# Patient Record
Sex: FEMALE | Race: WHITE | NOT HISPANIC OR LATINO
[De-identification: names, ages, dates, MRNs, and addresses within clinical notes are randomized per-mention and may not be internally consistent; named-entity substitution may affect disease eponyms.]

---

## 2018-03-09 PROBLEM — C54.1 ENDOMETRIAL CARCINOMA: Status: RESOLVED | Noted: 2018-03-09 | Resolved: 2018-03-09

## 2018-03-12 ENCOUNTER — APPOINTMENT (OUTPATIENT)
Dept: HEMATOLOGY ONCOLOGY | Facility: CLINIC | Age: 58
End: 2018-03-12
Payer: COMMERCIAL

## 2018-03-12 VITALS
BODY MASS INDEX: 28.53 KG/M2 | DIASTOLIC BLOOD PRESSURE: 76 MMHG | HEIGHT: 62.6 IN | WEIGHT: 159 LBS | OXYGEN SATURATION: 96 % | RESPIRATION RATE: 16 BRPM | SYSTOLIC BLOOD PRESSURE: 121 MMHG | HEART RATE: 83 BPM | TEMPERATURE: 98.7 F

## 2018-03-12 DIAGNOSIS — Z80.0 FAMILY HISTORY OF MALIGNANT NEOPLASM OF DIGESTIVE ORGANS: ICD-10-CM

## 2018-03-12 DIAGNOSIS — Z78.9 OTHER SPECIFIED HEALTH STATUS: ICD-10-CM

## 2018-03-12 DIAGNOSIS — G47.09 OTHER INSOMNIA: ICD-10-CM

## 2018-03-12 DIAGNOSIS — Z80.41 FAMILY HISTORY OF MALIGNANT NEOPLASM OF OVARY: ICD-10-CM

## 2018-03-12 DIAGNOSIS — Z86.39 PERSONAL HISTORY OF OTHER ENDOCRINE, NUTRITIONAL AND METABOLIC DISEASE: ICD-10-CM

## 2018-03-12 DIAGNOSIS — C54.1 MALIGNANT NEOPLASM OF ENDOMETRIUM: ICD-10-CM

## 2018-03-12 DIAGNOSIS — Z87.891 PERSONAL HISTORY OF NICOTINE DEPENDENCE: ICD-10-CM

## 2018-03-12 DIAGNOSIS — Z80.51 FAMILY HISTORY OF MALIGNANT NEOPLASM OF KIDNEY: ICD-10-CM

## 2018-03-12 PROCEDURE — 99245 OFF/OP CONSLTJ NEW/EST HI 55: CPT

## 2018-03-12 RX ORDER — ATORVASTATIN CALCIUM 10 MG/1
10 TABLET, FILM COATED ORAL
Refills: 0 | Status: ACTIVE | COMMUNITY

## 2018-03-12 RX ORDER — LEVOTHYROXINE SODIUM 0.09 MG/1
88 TABLET ORAL
Refills: 0 | Status: ACTIVE | COMMUNITY

## 2018-04-09 ENCOUNTER — APPOINTMENT (OUTPATIENT)
Dept: HEMATOLOGY ONCOLOGY | Facility: CLINIC | Age: 58
End: 2018-04-09
Payer: COMMERCIAL

## 2018-04-09 VITALS
RESPIRATION RATE: 20 BRPM | TEMPERATURE: 98.4 F | SYSTOLIC BLOOD PRESSURE: 124 MMHG | OXYGEN SATURATION: 98 % | DIASTOLIC BLOOD PRESSURE: 80 MMHG | BODY MASS INDEX: 28.89 KG/M2 | WEIGHT: 161 LBS | HEART RATE: 75 BPM | HEIGHT: 62.6 IN

## 2018-04-09 DIAGNOSIS — Z00.00 ENCOUNTER FOR GENERAL ADULT MEDICAL EXAMINATION W/OUT ABNORMAL FINDINGS: ICD-10-CM

## 2018-04-09 PROCEDURE — 99215 OFFICE O/P EST HI 40 MIN: CPT

## 2018-04-16 ENCOUNTER — OTHER (OUTPATIENT)
Age: 58
End: 2018-04-16

## 2018-04-16 DIAGNOSIS — Z91.041 RADIOGRAPHIC DYE ALLERGY STATUS: ICD-10-CM

## 2018-04-24 ENCOUNTER — OTHER (OUTPATIENT)
Age: 58
End: 2018-04-24

## 2018-04-25 ENCOUNTER — OTHER (OUTPATIENT)
Age: 58
End: 2018-04-25

## 2018-05-30 ENCOUNTER — RX RENEWAL (OUTPATIENT)
Age: 58
End: 2018-05-30

## 2018-07-09 ENCOUNTER — APPOINTMENT (OUTPATIENT)
Dept: HEMATOLOGY ONCOLOGY | Facility: CLINIC | Age: 58
End: 2018-07-09
Payer: COMMERCIAL

## 2018-07-23 PROBLEM — Z78.9 ALCOHOL USE: Status: ACTIVE | Noted: 2018-03-12

## 2018-08-06 ENCOUNTER — APPOINTMENT (OUTPATIENT)
Dept: HEMATOLOGY ONCOLOGY | Facility: CLINIC | Age: 58
End: 2018-08-06
Payer: COMMERCIAL

## 2018-08-06 VITALS
WEIGHT: 162 LBS | RESPIRATION RATE: 20 BRPM | OXYGEN SATURATION: 98 % | SYSTOLIC BLOOD PRESSURE: 112 MMHG | DIASTOLIC BLOOD PRESSURE: 76 MMHG | TEMPERATURE: 98 F | HEIGHT: 62.6 IN | HEART RATE: 86 BPM | BODY MASS INDEX: 29.07 KG/M2

## 2018-08-06 PROCEDURE — 99214 OFFICE O/P EST MOD 30 MIN: CPT

## 2018-08-06 RX ORDER — METHYLPREDNISOLONE 8 MG/1
8 TABLET ORAL
Qty: 8 | Refills: 1 | Status: DISCONTINUED | COMMUNITY
Start: 2018-04-16 | End: 2018-08-06

## 2018-11-05 ENCOUNTER — APPOINTMENT (OUTPATIENT)
Dept: HEMATOLOGY ONCOLOGY | Facility: CLINIC | Age: 58
End: 2018-11-05
Payer: COMMERCIAL

## 2018-11-08 ENCOUNTER — RECORD ABSTRACTING (OUTPATIENT)
Age: 58
End: 2018-11-08

## 2018-11-08 DIAGNOSIS — E03.8 OTHER SPECIFIED HYPOTHYROIDISM: ICD-10-CM

## 2018-11-08 DIAGNOSIS — Z80.3 FAMILY HISTORY OF MALIGNANT NEOPLASM OF BREAST: ICD-10-CM

## 2018-11-08 DIAGNOSIS — Z83.3 FAMILY HISTORY OF DIABETES MELLITUS: ICD-10-CM

## 2018-11-08 DIAGNOSIS — Z80.8 FAMILY HISTORY OF MALIGNANT NEOPLASM OF OTHER ORGANS OR SYSTEMS: ICD-10-CM

## 2018-11-08 DIAGNOSIS — Z85.3 PERSONAL HISTORY OF MALIGNANT NEOPLASM OF BREAST: ICD-10-CM

## 2018-11-09 ENCOUNTER — RECORD ABSTRACTING (OUTPATIENT)
Age: 58
End: 2018-11-09

## 2018-11-16 ENCOUNTER — OTHER (OUTPATIENT)
Age: 58
End: 2018-11-16

## 2018-11-26 ENCOUNTER — RESULT REVIEW (OUTPATIENT)
Age: 58
End: 2018-11-26

## 2018-11-26 ENCOUNTER — APPOINTMENT (OUTPATIENT)
Dept: HEMATOLOGY ONCOLOGY | Facility: CLINIC | Age: 58
End: 2018-11-26
Payer: COMMERCIAL

## 2018-11-26 VITALS
DIASTOLIC BLOOD PRESSURE: 88 MMHG | RESPIRATION RATE: 18 BRPM | WEIGHT: 165 LBS | HEART RATE: 99 BPM | TEMPERATURE: 98 F | OXYGEN SATURATION: 96 % | BODY MASS INDEX: 29.6 KG/M2 | SYSTOLIC BLOOD PRESSURE: 142 MMHG

## 2018-11-26 PROCEDURE — 99214 OFFICE O/P EST MOD 30 MIN: CPT

## 2018-11-26 RX ORDER — IBANDRONATE SODIUM 150 MG/1
150 TABLET, FILM COATED ORAL
Refills: 0 | Status: DISCONTINUED | COMMUNITY
End: 2018-11-26

## 2018-11-26 NOTE — ASSESSMENT
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 \par - continue close surveillance\par - CBC,Chem Profile, CEA,,CA27-29,\par \par \par \par \par Breast cancer\par Stage Ia ( T1a) 5mm, ER/ [place] positive, HER 2 kassy not amplified. \par Anastrozole  1mg PO daily x 4 years - continue \par Patient had genetic testing done only for BRCA1 and 2- extended genetic testing done today with [facility] in view of extensive h/o malignancy in the family ( renal, colon cancer etc).\par \par \par Mammogram/ Sonogram due in August 2018.\par \par Discussed weight control and healthy eating habits.  Encourage to participate in moderate exercise.\par \par Uterine cancer 2013 - stage I, FIGO grade 1, endometrioid, s/p TAHBSO\par \par \par \par

## 2018-11-26 NOTE — HISTORY OF PRESENT ILLNESS
[Disease: _____________________] : Disease: [unfilled] [de-identified] : Mrs Puga is a 57 year old postmenopausal female who presents for transfer of care for history of Stage IA (U6e-7dr N0M0) well differentiated intraductal ductal carcinoma of the right breast, ER/[place] positive, Her 2 negative s/p right lumpectomy sentinel node biopsy (Dr. Barker) , radiation therapy ( Dr. Izquierdo) and receiving Arimidex since 4/8/2015, followed by Dr.Julie Cifuentes at [facility].   She also has a history of Stage I (S8aOyH2) Grade 1 endometrial cancer and is status post PAULA/BSO with Dr. Rogel. She has a family history  notable for a mother with  ovarian cancer, metastatic renal cell cancer to the liver, a maternal grandmother with breast cancer in her 40s, and a half aunt with breast cancer.  Genetic testing for BRCA was negative in 2014. She was started on Boniva in 2014 when bone density at the time showed osteopenia (-1.7 L spine) and continues on it now with most recent bone density done on 08/17/2017 showing osteopenia but with T score -1.4 in the L spine and hip. She also had mammography in Aug of 2017 which she reports as normal.  She denies current complaint and tolerates Arimidex well.   [de-identified] : Patient presents today for follow up of BCA and endometrial cancer, she continues on Anastrazole with no issues.  She currently has a URI with congestion, arthralgias, cough, and pharyngitis. She denies fever.

## 2018-11-26 NOTE — RESULTS/DATA
[FreeTextEntry1] : 8/6/18\par CBC WBC 6.4 hemoglobin 13.0 hematocrit 30.9 platelets 230,000\par CEA 1.2\par CA 19-9 <1.0\par CA 27-29 11.6\par  10

## 2018-11-26 NOTE — REVIEW OF SYSTEMS
[Negative] : Allergic/Immunologic [Fatigue] : fatigue [Cough] : cough [Fever] : no fever [Recent Change In Weight] : ~T no recent weight change [Eye Pain] : no eye pain [Vision Problems] : no vision problems [Dysphagia] : no dysphagia [Hoarseness] : no hoarseness [Mucosal Pain] : no mucosal pain [Chest Pain] : no chest pain [Lower Ext Edema] : no lower extremity edema [Shortness Of Breath] : no shortness of breath [Vomiting] : no vomiting [Constipation] : no constipation [Diarrhea] : no diarrhea [Dysuria] : no dysuria [Joint Pain] : no joint pain [Muscle Pain] : no muscle pain [Skin Rash] : no skin rash [Dizziness] : no dizziness [Insomnia] : no insomnia [Anxiety] : no anxiety [Depression] : no depression [Muscle Weakness] : no muscle weakness [Easy Bleeding] : no tendency for easy bleeding [Easy Bruising] : no tendency for easy bruising [FreeTextEntry6] : Upper resp sx's

## 2018-11-26 NOTE — CONSULT LETTER
[Dear  ___] : Dear  [unfilled], [Consult Letter:] : I had the pleasure of evaluating your patient, [unfilled]. [Please see my note below.] : Please see my note below. [Consult Closing:] : Thank you very much for allowing me to participate in the care of this patient.  If you have any questions, please do not hesitate to contact me. [Sincerely,] : Sincerely, [DrStephy  ___] : Dr. GOSS [FreeTextEntry3] : Licha Yuan MD\par [facility] [place] at [facility]\par

## 2018-11-26 NOTE — REASON FOR VISIT
[Follow-Up Visit] : a follow-up [Spouse] : spouse [FreeTextEntry2] : breast cancer, endometrial cancer

## 2018-11-26 NOTE — PHYSICAL EXAM
[Fully active, able to carry on all pre-disease performance without restriction] : Status 0 - Fully active, able to carry on all pre-disease performance without restriction [Normal] : affect appropriate [de-identified] : left breast - lumpectomy scar

## 2018-12-20 ENCOUNTER — RESULT REVIEW (OUTPATIENT)
Age: 58
End: 2018-12-20

## 2019-02-25 ENCOUNTER — APPOINTMENT (OUTPATIENT)
Dept: HEMATOLOGY ONCOLOGY | Facility: CLINIC | Age: 59
End: 2019-02-25
Payer: COMMERCIAL

## 2019-02-25 PROCEDURE — 99499A: CUSTOM | Mod: NC

## 2019-02-26 ENCOUNTER — OTHER (OUTPATIENT)
Age: 59
End: 2019-02-26

## 2019-06-07 ENCOUNTER — RX RENEWAL (OUTPATIENT)
Age: 59
End: 2019-06-07

## 2019-10-02 ENCOUNTER — RESULT REVIEW (OUTPATIENT)
Age: 59
End: 2019-10-02

## 2019-10-03 ENCOUNTER — RESULT REVIEW (OUTPATIENT)
Age: 59
End: 2019-10-03

## 2019-10-03 ENCOUNTER — APPOINTMENT (OUTPATIENT)
Dept: HEMATOLOGY ONCOLOGY | Facility: CLINIC | Age: 59
End: 2019-10-03
Payer: COMMERCIAL

## 2019-10-03 VITALS
DIASTOLIC BLOOD PRESSURE: 82 MMHG | WEIGHT: 161.7 LBS | OXYGEN SATURATION: 96 % | BODY MASS INDEX: 29.01 KG/M2 | HEIGHT: 62.6 IN | RESPIRATION RATE: 16 BRPM | HEART RATE: 96 BPM | SYSTOLIC BLOOD PRESSURE: 127 MMHG

## 2019-10-03 PROCEDURE — 99214 OFFICE O/P EST MOD 30 MIN: CPT

## 2019-11-25 ENCOUNTER — RX RENEWAL (OUTPATIENT)
Age: 59
End: 2019-11-25

## 2019-11-29 NOTE — CONSULT LETTER
[Consult Letter:] : I had the pleasure of evaluating your patient, [unfilled]. [Dear  ___] : Dear  [unfilled], [Please see my note below.] : Please see my note below. [FreeTextEntry3] : Licha Yuan MD\par [facility] [place] at [facility]\par  [Consult Closing:] : Thank you very much for allowing me to participate in the care of this patient.  If you have any questions, please do not hesitate to contact me. [Sincerely,] : Sincerely, [DrStephy  ___] : Dr. GOSS

## 2019-11-29 NOTE — ASSESSMENT
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 \par Anastrozole  1mg PO daily x 4 years - continue \par \par last bone density 8/17\par T-score -2.4\par Risk factors\par Recommended:\par 1. Vitamin D\par 2. Calcium supplement 500mg\par 3. Weight bearing exercises\par \par \par \par \par Mammogram/ Sonogram due 12/19\par \par Discussed weight control and healthy eating habits.  Encourage to participate in moderate exercise.\par \par Uterine cancer 2013 - stage I, FIGO grade 1, endometrioid, s/p TAHBSO\par \par \par \par

## 2019-11-29 NOTE — HISTORY OF PRESENT ILLNESS
[de-identified] : Mrs Puag is a 57 year old postmenopausal female who presents for transfer of care for history of Stage IA (L5x-1pg N0M0) well differentiated intraductal ductal carcinoma of the right breast, ER/[place] positive, Her 2 negative s/p right lumpectomy sentinel node biopsy (Dr. Barker) , radiation therapy ( Dr. Izquierdo) and receiving Arimidex since 4/8/2015, followed by Dr.Julie Cifuentes at [facility].   She also has a history of Stage I (Y6yAeD5) Grade 1 endometrial cancer and is status post PAULA/BSO with Dr. Rogel. She has a family history  notable for a mother with  ovarian cancer, metastatic renal cell cancer to the liver, a maternal grandmother with breast cancer in her 40s, and a half aunt with breast cancer.  Genetic testing for BRCA was negative in 2014. She was started on Boniva in 2014 when bone density at the time showed osteopenia (-1.7 L spine) and continues on it now with most recent bone density done on 08/17/2017 showing osteopenia but with T score -1.4 in the L spine and hip. She also had mammography in Aug of 2017 which she reports as normal.  She denies current complaint and tolerates Arimidex well.   [Disease: _____________________] : Disease: [unfilled] [de-identified] : Patient presents today for follow up of BCA and endometrial cancer, she continues on Anastrazole with no issues.  She currently has a URI with congestion, arthralgias, cough, and pharyngitis. She denies fever.

## 2019-11-29 NOTE — CONSULT LETTER
[Consult Letter:] : I had the pleasure of evaluating your patient, [unfilled]. [Dear  ___] : Dear  [unfilled], [Please see my note below.] : Please see my note below. [FreeTextEntry3] : Licha Yuan MD\par [facility] [place] at [facility]\par  [Consult Closing:] : Thank you very much for allowing me to participate in the care of this patient.  If you have any questions, please do not hesitate to contact me. [Sincerely,] : Sincerely, [DrStephy  ___] : Dr. GOSS

## 2019-11-29 NOTE — PHYSICAL EXAM
[Fully active, able to carry on all pre-disease performance without restriction] : Status 0 - Fully active, able to carry on all pre-disease performance without restriction [Normal] : affect appropriate [de-identified] : left breast - lumpectomy scar

## 2019-11-29 NOTE — REVIEW OF SYSTEMS
[Fatigue] : fatigue [Fever] : no fever [Recent Change In Weight] : ~T no recent weight change [Eye Pain] : no eye pain [Vision Problems] : no vision problems [Dysphagia] : no dysphagia [Hoarseness] : no hoarseness [Chest Pain] : no chest pain [Mucosal Pain] : no mucosal pain [Vomiting] : no vomiting [Cough] : cough [Shortness Of Breath] : no shortness of breath [Lower Ext Edema] : no lower extremity edema [Constipation] : no constipation [Dysuria] : no dysuria [Diarrhea] : no diarrhea [Skin Rash] : no skin rash [Joint Pain] : no joint pain [Muscle Pain] : no muscle pain [Insomnia] : no insomnia [Dizziness] : no dizziness [Muscle Weakness] : no muscle weakness [Easy Bleeding] : no tendency for easy bleeding [Anxiety] : no anxiety [Depression] : no depression [Easy Bruising] : no tendency for easy bruising [Negative] : Endocrine [FreeTextEntry6] : Upper resp sx's

## 2019-11-29 NOTE — REVIEW OF SYSTEMS
[Fatigue] : fatigue [Recent Change In Weight] : ~T no recent weight change [Fever] : no fever [Eye Pain] : no eye pain [Vision Problems] : no vision problems [Dysphagia] : no dysphagia [Hoarseness] : no hoarseness [Chest Pain] : no chest pain [Mucosal Pain] : no mucosal pain [Shortness Of Breath] : no shortness of breath [Cough] : cough [Vomiting] : no vomiting [Lower Ext Edema] : no lower extremity edema [Dysuria] : no dysuria [Constipation] : no constipation [Diarrhea] : no diarrhea [Skin Rash] : no skin rash [Joint Pain] : no joint pain [Muscle Pain] : no muscle pain [Insomnia] : no insomnia [Dizziness] : no dizziness [Anxiety] : no anxiety [Muscle Weakness] : no muscle weakness [Easy Bleeding] : no tendency for easy bleeding [Depression] : no depression [Easy Bruising] : no tendency for easy bruising [Negative] : Allergic/Immunologic [FreeTextEntry6] : Upper resp sx's

## 2019-11-29 NOTE — HISTORY OF PRESENT ILLNESS
[Disease: _____________________] : Disease: [unfilled] [de-identified] : Mrs Puga is a 57 year old postmenopausal female who presents for transfer of care for history of Stage IA (R2e-5tb N0M0) well differentiated intraductal ductal carcinoma of the right breast, ER/[place] positive, Her 2 negative s/p right lumpectomy sentinel node biopsy (Dr. Barker) , radiation therapy ( Dr. Izquierdo) and receiving Arimidex since 4/8/2015, followed by Dr.Julie Cifuentes at [facility].   She also has a history of Stage I (U8oPgF4) Grade 1 endometrial cancer and is status post PAULA/BSO with Dr. Rogel. She has a family history  notable for a mother with  ovarian cancer, metastatic renal cell cancer to the liver, a maternal grandmother with breast cancer in her 40s, and a half aunt with breast cancer.  Genetic testing for BRCA was negative in 2014. She was started on Boniva in 2014 when bone density at the time showed osteopenia (-1.7 L spine) and continues on it now with most recent bone density done on 08/17/2017 showing osteopenia but with T score -1.4 in the L spine and hip. She also had mammography in Aug of 2017 which she reports as normal.  She denies current complaint and tolerates Arimidex well.   [de-identified] : Patient presents today for follow up of BCA and endometrial cancer, she continues on Anastrazole with no issues.  She currently has a URI with congestion, arthralgias, cough, and pharyngitis. She denies fever.

## 2019-11-29 NOTE — PHYSICAL EXAM
[Fully active, able to carry on all pre-disease performance without restriction] : Status 0 - Fully active, able to carry on all pre-disease performance without restriction [de-identified] : left breast - lumpectomy scar [Normal] : grossly intact

## 2019-11-29 NOTE — ASSESSMENT
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 \par Anastrozole  1mg PO daily x 4 years - continue \par \par last bone density 8/17\par T-score -2.4\par Risk factors\par Recommended:\par 1. Vitamin D\par 2. Calcium supplement 500mg\par 3. Weight bearing exercises\par \par \par \par \par Mammogram/ Sonogram due 12/19\par \par Discussed weight control and healthy eating habits.  Encourage to participate in moderate exercise.\par \par Uterine cancer 2013 - stage I, FIGO grade 1, endometrioid, s/p TAHBSO\par \par \par \par

## 2020-01-09 ENCOUNTER — RESULT REVIEW (OUTPATIENT)
Age: 60
End: 2020-01-09

## 2020-01-09 ENCOUNTER — APPOINTMENT (OUTPATIENT)
Dept: HEMATOLOGY ONCOLOGY | Facility: CLINIC | Age: 60
End: 2020-01-09
Payer: COMMERCIAL

## 2020-01-14 ENCOUNTER — RESULT REVIEW (OUTPATIENT)
Age: 60
End: 2020-01-14

## 2020-01-30 ENCOUNTER — RESULT REVIEW (OUTPATIENT)
Age: 60
End: 2020-01-30

## 2020-01-30 ENCOUNTER — APPOINTMENT (OUTPATIENT)
Dept: HEMATOLOGY ONCOLOGY | Facility: CLINIC | Age: 60
End: 2020-01-30
Payer: COMMERCIAL

## 2020-01-30 VITALS
HEIGHT: 62.6 IN | TEMPERATURE: 98 F | SYSTOLIC BLOOD PRESSURE: 133 MMHG | WEIGHT: 160 LBS | DIASTOLIC BLOOD PRESSURE: 83 MMHG | RESPIRATION RATE: 16 BRPM | BODY MASS INDEX: 28.71 KG/M2 | HEART RATE: 78 BPM | OXYGEN SATURATION: 97 %

## 2020-01-30 PROCEDURE — 99214 OFFICE O/P EST MOD 30 MIN: CPT

## 2020-01-30 RX ORDER — NYSTATIN 100000 1/G
100000 POWDER TOPICAL TWICE DAILY
Qty: 1 | Refills: 4 | Status: COMPLETED | COMMUNITY
Start: 2019-10-03 | End: 2020-01-30

## 2020-01-30 RX ORDER — CICLOPIROX OLAMINE 7.7 MG/G
0.77 CREAM TOPICAL TWICE DAILY
Qty: 1 | Refills: 1 | Status: DISCONTINUED | COMMUNITY
Start: 2019-10-03 | End: 2020-01-30

## 2020-01-30 NOTE — ASSESSMENT
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 , then every 2-5 years\par - UA to screen for hematuria for urothelial cancer- yearly - done today \par - s/p TAHBSO 2013 for endometrial cancer- monitor Ca-125\par - Breast cancer 2015- lumpectomy, on Anastrozole, continue \par - indicated for patient family testing \par - continue close surveillance\par - CBC,Chem Profile, CEA,,CA27-29,\par \par Breast cancer 2015\par Stage Ia ( T1a) 5mm, ER/ [place] positive, HER 2 kassy not amplified. \par Anastrozole  1mg PO daily x 5 years - continue \par Mammogram Jan 2020\par last bone density J an 2020\par T-score -2.3- no change \par Risk factors: postmenopausal, AI\par Recommended:\par 1. Vitamin D\par 2. Calcium supplement 500mg\par 3. Weight bearing exercises\par Offered Reclast or Prolia.\par Needs dental clearance\par \par \par \par Mammogram/ Sonogram due 12/19\par \par Discussed weight control and healthy eating habits.  Encourage to participate in moderate exercise.\par \par Uterine cancer 2013 - stage I, FIGO grade 1, endometrioid, s/p TAHBSO\par \par \par \par

## 2020-01-30 NOTE — REVIEW OF SYSTEMS
[Fever] : no fever [Recent Change In Weight] : ~T no recent weight change [Eye Pain] : no eye pain [Vision Problems] : no vision problems [Dysphagia] : no dysphagia [Hoarseness] : no hoarseness [Mucosal Pain] : no mucosal pain [Chest Pain] : no chest pain [Lower Ext Edema] : no lower extremity edema [Shortness Of Breath] : no shortness of breath [Cough] : no cough [Vomiting] : no vomiting [Constipation] : no constipation [Diarrhea] : no diarrhea [Dysuria] : no dysuria [Joint Pain] : no joint pain [Skin Rash] : no skin rash [Muscle Pain] : no muscle pain [Insomnia] : no insomnia [Dizziness] : no dizziness [Depression] : no depression [Muscle Weakness] : no muscle weakness [Anxiety] : no anxiety [Easy Bleeding] : no tendency for easy bleeding [Easy Bruising] : no tendency for easy bruising [FreeTextEntry4] : rhinitis

## 2020-01-30 NOTE — CONSULT LETTER
[FreeTextEntry3] : Licha Yuan MD\par [facility] [place] at [facility]\par

## 2020-01-30 NOTE — HISTORY OF PRESENT ILLNESS
[de-identified] : Mrs Puga is a 57 year old postmenopausal female who presents for transfer of care for history of Stage IA (H2e-9zi N0M0) well differentiated intraductal ductal carcinoma of the right breast, ER/[place] positive, Her 2 negative s/p right lumpectomy sentinel node biopsy (Dr. Barker) , radiation therapy ( Dr. Izquierdo) and receiving Arimidex since 4/8/2015, followed by Dr.Julie Cifuentes at [facility].   She also has a history of Stage I (I9bQrG0) Grade 1 endometrial cancer and is status post PAULA/BSO with Dr. Rogel. She has a family history  notable for a mother with  ovarian cancer, metastatic renal cell cancer to the liver, a maternal grandmother with breast cancer in her 40s, and a half aunt with breast cancer.  Genetic testing for BRCA was negative in 2014. She was started on Boniva in 2014 when bone density at the time showed osteopenia (-1.7 L spine) and continues on it now with most recent bone density done on 08/17/2017 showing osteopenia but with T score -1.4 in the L spine and hip. She also had mammography in Aug of 2017 which she reports as normal.  She denies current complaint and tolerates Arimidex well.   [de-identified] : Patient presents today for follow up of BCA and endometrial cancer, she continues on Anastrazole with no issues.  Patient is s/p Influenza then URI in Dec- feeling better\par \par BMD Jan 2020- Osteopenic \par Mammo/US Jan 2020- No evidence of malignancy

## 2020-03-30 ENCOUNTER — APPOINTMENT (OUTPATIENT)
Dept: GASTROENTEROLOGY | Facility: CLINIC | Age: 60
End: 2020-03-30

## 2020-05-21 ENCOUNTER — APPOINTMENT (OUTPATIENT)
Dept: OBGYN | Facility: CLINIC | Age: 60
End: 2020-05-21

## 2020-06-29 ENCOUNTER — APPOINTMENT (OUTPATIENT)
Dept: GASTROENTEROLOGY | Facility: CLINIC | Age: 60
End: 2020-06-29
Payer: COMMERCIAL

## 2020-06-29 VITALS
WEIGHT: 163 LBS | OXYGEN SATURATION: 98 % | BODY MASS INDEX: 28.88 KG/M2 | DIASTOLIC BLOOD PRESSURE: 67 MMHG | HEIGHT: 63 IN | SYSTOLIC BLOOD PRESSURE: 137 MMHG | HEART RATE: 75 BPM | TEMPERATURE: 96.8 F

## 2020-06-29 PROCEDURE — 99214 OFFICE O/P EST MOD 30 MIN: CPT

## 2020-06-29 NOTE — ASSESSMENT
[FreeTextEntry1] : RLQ pain-unclear etiology, moderate tenderness on exam. Recommend CT A/P with IV contrast for further evaluation. \par \par Beth Syndrome- \par patient due for colon cancer and gastric cancer screening with EGD/Colonoscopy. \par Risks (including but not limited to sedation risks, infection, bleeding, perforation, possibility of missed lesions), benefits and alternatives to procedure, including not doing the procedure, were discussed with patient. Patient understood and agreed to proceed with colonoscopy. \par Colonoscopy preparation instructions reviewed with patient.\par Split MiraLAX/Dulcolax preparation starting day prior to procedure\par \par

## 2020-06-29 NOTE — HISTORY OF PRESENT ILLNESS
[FreeTextEntry1] : 58 yo f with hypothyroidism, osteoporosis, h/o breast ca s/p left lumpectomy and xrt, Beth Sx, endometrial ca s/p PAULA BSO, family h/o colon and endometrial ca, s/p csection, s/p rotator cuff surgery presents for follow up.\par \par She is due for EGD/Colonoscopy. \par She feels well. \par Her sister passed away from CJD last year and her father passed away from traumatic injury last year. \par She notes intermittent RLQ pain x 2 months. not associated with eating or with bm. \par Pain lasts 5-10 minutes. \par She feels it radiating to her back. \par She has gained weight recently. her csection scar feels more irritated. \par She has a good appetite. no hb/reflux/n/v. no dysphagia/odynophagia. regular bm. no brbpr/melena. \par \par \par        She saw Dr. Yuan in 04/2018 and genetic testing showed that she has Beth syndrome with LSH6 mutation. \par        She was recommended to have colonoscopy q 1-2 years, EGD every 2-5 years\par \par        Colonoscopy 10/23/2017 for screening- \par        normal TI, hyperplastic descending colon polyp, diverticulosis, large internal hemorrhoids on retroflexion. \par \par EGD 07/2018- reactive gastropathy, gastric fundic gland polyp. \par EGD with side viewer 08/2018- unremarkable \par

## 2020-06-29 NOTE — PHYSICAL EXAM
[General Appearance - In No Acute Distress] : in no acute distress [Outer Ear] : the ears and nose were normal in appearance [General Appearance - Alert] : alert [Sclera] : the sclera and conjunctiva were normal [Hearing Threshold Finger Rub Not [place]] : hearing was normal [] : no respiratory distress [Neck Appearance] : the appearance of the neck was normal [Apical Impulse] : the apical impulse was normal [Abdomen Soft] : soft [Abnormal Walk] : normal gait [No CVA Tenderness] : no ~M costovertebral angle tenderness [Cranial Nerves] : cranial nerves 2-12 were intact [Skin Color & Pigmentation] : normal skin color and pigmentation [Oriented To Time, Place, And Person] : oriented to person, place, and time [Affect] : the affect was normal [Mood] : the mood was normal [Impaired Insight] : insight and judgment were intact [FreeTextEntry1] : deferred to upcoming colonoscopy

## 2020-07-10 ENCOUNTER — RESULT REVIEW (OUTPATIENT)
Age: 60
End: 2020-07-10

## 2020-07-12 ENCOUNTER — RESULT REVIEW (OUTPATIENT)
Age: 60
End: 2020-07-12

## 2020-07-13 ENCOUNTER — APPOINTMENT (OUTPATIENT)
Dept: GASTROENTEROLOGY | Facility: HOSPITAL | Age: 60
End: 2020-07-13

## 2020-07-16 ENCOUNTER — APPOINTMENT (OUTPATIENT)
Dept: HEMATOLOGY ONCOLOGY | Facility: CLINIC | Age: 60
End: 2020-07-16
Payer: COMMERCIAL

## 2020-07-16 ENCOUNTER — RESULT REVIEW (OUTPATIENT)
Age: 60
End: 2020-07-16

## 2020-07-16 VITALS
HEIGHT: 62.99 IN | WEIGHT: 161 LBS | TEMPERATURE: 98.3 F | HEART RATE: 80 BPM | RESPIRATION RATE: 18 BRPM | BODY MASS INDEX: 28.53 KG/M2 | OXYGEN SATURATION: 97 % | SYSTOLIC BLOOD PRESSURE: 118 MMHG | DIASTOLIC BLOOD PRESSURE: 81 MMHG

## 2020-07-16 PROCEDURE — 99214 OFFICE O/P EST MOD 30 MIN: CPT

## 2020-07-16 NOTE — REASON FOR VISIT
[Spouse] : spouse [Follow-Up Visit] : a follow-up [FreeTextEntry2] : breast cancer, endometrial cancer

## 2020-07-16 NOTE — CONSULT LETTER
[Consult Letter:] : I had the pleasure of evaluating your patient, [unfilled]. [Dear  ___] : Dear  [unfilled], [Consult Closing:] : Thank you very much for allowing me to participate in the care of this patient.  If you have any questions, please do not hesitate to contact me. [Please see my note below.] : Please see my note below. [Sincerely,] : Sincerely, [DrStephy  ___] : Dr. GOSS [FreeTextEntry3] : Licha Yuan MD\par [facility] [place] at [facility]\par

## 2020-07-16 NOTE — ASSESSMENT
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 , then every 2-5 years\par - UA to screen for hematuria for urothelial cancer- yearly - done today \par - s/p TAHBSO 2013 for endometrial cancer- monitor Ca-125\par - Breast cancer 2015- lumpectomy, on Anastrozole, continue \par - indicated for patient family testing \par - colonoscopy July 2020- sessile polyp - needs follow in 3 months, d/w Dr. Lennon\par - CT scan abd/ pelvis- abdominal pain   \par - continue close surveillance\par - CBC,Chem Profile, CEA,,CA27-29,\par \par Breast cancer 2015\par Stage Ia ( T1a) 5mm, ER/ [place] positive, HER 2 kassy not amplified. \par Anastrozole  1mg PO daily x 5 years - continue \par Mammogram Jan 2020\par \par Osteopenia\par last bone density Jan 2020\par T-score -2.3- no change \par Risk factors: postmenopausal, AI\par Recommended:\par 1. Vitamin D\par 2. Calcium supplement 500mg\par 3. Weight bearing exercises\par Offered Reclast or Prolia.\par Needs dental clearance\par \par \par \par Mammogram/ Sonogram due 12/19\par \par Discussed weight control and healthy eating habits.  Encourage to participate in moderate exercise.\par \par Uterine cancer 2013 - stage I, FIGO grade 1, endometrioid, s/p TAHBSO\par \par \par \par

## 2020-07-16 NOTE — HISTORY OF PRESENT ILLNESS
[Disease: _____________________] : Disease: [unfilled] [de-identified] : Patient presents today for follow up of BCA and endometrial cancer, she continues on Anastrazole with no issues.  Had a colonoscopy Monday with Dr. Fragoso.  \par \par BMD Jan 2020- Osteopenic \par Mammo/US Jan 2020- No evidence of malignancy  [de-identified] : Mrs Puga is a 57 year old postmenopausal female who presents for transfer of care for history of Stage IA (W2n-1jh N0M0) well differentiated intraductal ductal carcinoma of the right breast, ER/PA positive, Her 2 negative s/p right lumpectomy sentinel node biopsy (Dr. Barker) , radiation therapy ( Dr. Izquierdo) and receiving Arimidex since 4/8/2015, followed by Dr.Julie Cifuentes at [facility].   She also has a history of Stage I ([id number]) Grade 1 endometrial cancer and is status post PAULA/BSO with Dr. Rogel. She has a family history  notable for a mother with  ovarian cancer, metastatic renal cell cancer to the liver, a maternal grandmother with breast cancer in her 40s, and a half aunt with breast cancer.  Genetic testing for BRCA was negative in 2014. She was started on Boniva in 2014 when bone density at the time showed osteopenia (-1.7 L spine) and continues on it now with most recent bone density done on 08/17/2017 showing osteopenia but with T score -1.4 in the L spine and hip. She also had mammography in Aug of 2017 which she reports as normal.  She denies current complaint and tolerates Arimidex well.

## 2020-07-16 NOTE — PHYSICAL EXAM
[Fully active, able to carry on all pre-disease performance without restriction] : Status 0 - Fully active, able to carry on all pre-disease performance without restriction [Normal] : grossly intact [de-identified] : left breast - lumpectomy scar

## 2020-07-16 NOTE — REVIEW OF SYSTEMS
[Fatigue] : fatigue [Negative] : Allergic/Immunologic [Fever] : no fever [Vision Problems] : no vision problems [Eye Pain] : no eye pain [Recent Change In Weight] : ~T no recent weight change [Dysphagia] : no dysphagia [Hoarseness] : no hoarseness [Mucosal Pain] : no mucosal pain [Chest Pain] : no chest pain [Lower Ext Edema] : no lower extremity edema [Shortness Of Breath] : no shortness of breath [Vomiting] : no vomiting [Cough] : no cough [Diarrhea] : no diarrhea [Constipation] : no constipation [Dysuria] : no dysuria [Muscle Pain] : no muscle pain [Joint Pain] : no joint pain [Insomnia] : no insomnia [Dizziness] : no dizziness [Skin Rash] : no skin rash [Depression] : no depression [Anxiety] : no anxiety [Muscle Weakness] : no muscle weakness [Easy Bleeding] : no tendency for easy bleeding [Easy Bruising] : no tendency for easy bruising [FreeTextEntry4] : rhinitis

## 2020-07-27 ENCOUNTER — RESULT REVIEW (OUTPATIENT)
Age: 60
End: 2020-07-27

## 2020-08-07 ENCOUNTER — APPOINTMENT (OUTPATIENT)
Dept: OBGYN | Facility: CLINIC | Age: 60
End: 2020-08-07
Payer: COMMERCIAL

## 2020-08-07 VITALS
BODY MASS INDEX: 29.81 KG/M2 | WEIGHT: 162 LBS | HEIGHT: 62 IN | DIASTOLIC BLOOD PRESSURE: 64 MMHG | TEMPERATURE: 97.2 F | SYSTOLIC BLOOD PRESSURE: 118 MMHG

## 2020-08-07 DIAGNOSIS — Z01.419 ENCOUNTER FOR GYNECOLOGICAL EXAMINATION (GENERAL) (ROUTINE) W/OUT ABNORMAL FINDINGS: ICD-10-CM

## 2020-08-07 DIAGNOSIS — L30.9 DERMATITIS, UNSPECIFIED: ICD-10-CM

## 2020-08-07 PROCEDURE — 36415 COLL VENOUS BLD VENIPUNCTURE: CPT

## 2020-08-07 PROCEDURE — 99396 PREV VISIT EST AGE 40-64: CPT

## 2020-08-07 RX ORDER — CLOTRIMAZOLE AND BETAMETHASONE DIPROPIONATE 10; .5 MG/G; MG/G
1-0.05 CREAM TOPICAL 3 TIMES DAILY
Qty: 1 | Refills: 3 | Status: ACTIVE | COMMUNITY
Start: 2020-08-07 | End: 1900-01-01

## 2020-08-08 LAB — CANCER AG125 SERPL-ACNC: 8 U/ML

## 2020-08-27 ENCOUNTER — APPOINTMENT (OUTPATIENT)
Dept: OBGYN | Facility: CLINIC | Age: 60
End: 2020-08-27

## 2020-10-19 ENCOUNTER — APPOINTMENT (OUTPATIENT)
Dept: GASTROENTEROLOGY | Facility: HOSPITAL | Age: 60
End: 2020-10-19

## 2020-11-28 ENCOUNTER — RESULT REVIEW (OUTPATIENT)
Age: 60
End: 2020-11-28

## 2020-11-29 ENCOUNTER — RESULT REVIEW (OUTPATIENT)
Age: 60
End: 2020-11-29

## 2020-11-30 ENCOUNTER — APPOINTMENT (OUTPATIENT)
Dept: GASTROENTEROLOGY | Facility: HOSPITAL | Age: 60
End: 2020-11-30

## 2020-12-03 ENCOUNTER — APPOINTMENT (OUTPATIENT)
Dept: HEMATOLOGY ONCOLOGY | Facility: CLINIC | Age: 60
End: 2020-12-03
Payer: COMMERCIAL

## 2020-12-03 ENCOUNTER — RESULT REVIEW (OUTPATIENT)
Age: 60
End: 2020-12-03

## 2020-12-03 VITALS
OXYGEN SATURATION: 97 % | HEART RATE: 66 BPM | BODY MASS INDEX: 29.99 KG/M2 | WEIGHT: 162.99 LBS | HEIGHT: 62 IN | DIASTOLIC BLOOD PRESSURE: 81 MMHG | SYSTOLIC BLOOD PRESSURE: 117 MMHG | TEMPERATURE: 97.8 F | RESPIRATION RATE: 18 BRPM

## 2020-12-03 PROCEDURE — 99072 ADDL SUPL MATRL&STAF TM PHE: CPT

## 2020-12-03 PROCEDURE — 99214 OFFICE O/P EST MOD 30 MIN: CPT

## 2020-12-03 NOTE — REVIEW OF SYSTEMS
[Fatigue] : fatigue [Negative] : Allergic/Immunologic [Fever] : no fever [Recent Change In Weight] : ~T no recent weight change [Eye Pain] : no eye pain [Vision Problems] : no vision problems [Dysphagia] : no dysphagia [Hoarseness] : no hoarseness [Mucosal Pain] : no mucosal pain [Chest Pain] : no chest pain [Lower Ext Edema] : no lower extremity edema [Shortness Of Breath] : no shortness of breath [Cough] : no cough [Vomiting] : no vomiting [Constipation] : no constipation [Diarrhea] : no diarrhea [Dysuria] : no dysuria [Joint Pain] : no joint pain [Muscle Pain] : no muscle pain [Skin Rash] : no skin rash [Dizziness] : no dizziness [Insomnia] : no insomnia [Anxiety] : no anxiety [Depression] : no depression [Muscle Weakness] : no muscle weakness [Easy Bleeding] : no tendency for easy bleeding [Easy Bruising] : no tendency for easy bruising [FreeTextEntry4] : rhinitis

## 2020-12-03 NOTE — CONSULT LETTER
[Dear  ___] : Dear  [unfilled], [Consult Letter:] : I had the pleasure of evaluating your patient, [unfilled]. [Please see my note below.] : Please see my note below. [Consult Closing:] : Thank you very much for allowing me to participate in the care of this patient.  If you have any questions, please do not hesitate to contact me. [Sincerely,] : Sincerely, [DrStephy  ___] : Dr. GOSS [FreeTextEntry3] : Licha Yuan MD\par [facility] [place] [facility] [place] at [facility]\par

## 2020-12-03 NOTE — ASSESSMENT
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 , then every 2-5 years\par - UA to screen for hematuria for urothelial cancer- yearly - done today \par - s/p TAHBSO 2013 for endometrial cancer- monitor Ca-125\par - Breast cancer 2015- lumpectomy, on Anastrozole, continue.\par - indicated for patient family testing \par - colonoscopy July 2020- sessile polyp - needs follow in 3 months, d/w Dr. Lennon, colonoscopy Nov 2020- 4 mm hyperplastic sigmoid polyp\par - CT scan abd/ pelvis July 2020- BERNADINE \par - continue close surveillance\par - CBC,Chem Profile, CEA,,[id number],\par \par Breast cancer 2015\par Stage Ia ( T1a) 5mm, ER/ [place] positive, HER 2 kassy not amplified. \par Anastrozole  1mg PO daily x 5 years - continue \par Mammogram Jan 2020- due now, will consider MRI after 6 m\par \par Osteopenia\par last bone density Jan 2020\par T-score -2.3- no change \par Risk factors: postmenopausal, AI\par Recommended:\par 1. Vitamin D\par 2. Calcium supplement 500mg\par 3. Weight bearing exercises\par to start Prolia.\par Dental evaluation done \par \par Mammogram/ Sonogram due 12/19\par \par Discussed weight control and healthy eating habits.  Encourage to participate in moderate exercise.\par \par Uterine cancer 2013 - stage I, FIGO grade 1, endometrioid, s/p TAHBSO\par \par \par \par

## 2020-12-03 NOTE — PHYSICAL EXAM
[Fully active, able to carry on all pre-disease performance without restriction] : Status 0 - Fully active, able to carry on all pre-disease performance without restriction [Normal] : affect appropriate [de-identified] : left breast - lumpectomy scar

## 2020-12-03 NOTE — HISTORY OF PRESENT ILLNESS
[Disease: _____________________] : Disease: [unfilled] [de-identified] : Mrs Puga is a 57 year old postmenopausal female who presents for transfer of care for history of Stage IA (D5j-7uo N0M0) well differentiated intraductal ductal carcinoma of the right breast, ER/NE positive, Her 2 negative s/p right lumpectomy sentinel node biopsy (Dr. Barker) , radiation therapy ( Dr. Izquierdo) and receiving Arimidex since 4/8/2015, followed by Dr.Julie Cifuentes at [facility].   She also has a history of Stage I (R4bViC2) Grade 1 endometrial cancer and is status post PAULA/BSO with Dr. Rogel. She has a family history  notable for a mother with  ovarian cancer, metastatic renal cell cancer to the liver, a maternal grandmother with breast cancer in her 40s, and a half aunt with breast cancer.  Genetic testing for BRCA was negative in 2014. She was started on Boniva in 2014 when bone density at the time showed osteopenia (-1.7 L spine) and continues on it now with most recent bone density done on 08/17/2017 showing osteopenia but with T score -1.4 in the L spine and hip. She also had mammography in Aug of 2017 which she reports as normal.  She denies current complaint and tolerates Arimidex well.   [de-identified] : Patient presents today for follow up of BCA and endometrial cancer, she continues on Anastrazole with no issues.  Had a colonoscopy Monday with Dr. Fragoso.  \par \par BMD Jan 2020- Osteopenic \par Mammo/US Jan 2020- No evidence of malignancy

## 2020-12-23 PROBLEM — Z01.419 ENCOUNTER FOR ANNUAL ROUTINE GYNECOLOGICAL EXAMINATION: Status: RESOLVED | Noted: 2020-08-07 | Resolved: 2020-12-23

## 2021-04-22 ENCOUNTER — RESULT REVIEW (OUTPATIENT)
Age: 61
End: 2021-04-22

## 2021-04-28 ENCOUNTER — RESULT REVIEW (OUTPATIENT)
Age: 61
End: 2021-04-28

## 2021-05-17 ENCOUNTER — RESULT REVIEW (OUTPATIENT)
Age: 61
End: 2021-05-17

## 2021-05-18 ENCOUNTER — RESULT REVIEW (OUTPATIENT)
Age: 61
End: 2021-05-18

## 2021-05-18 ENCOUNTER — APPOINTMENT (OUTPATIENT)
Dept: HEMATOLOGY ONCOLOGY | Facility: CLINIC | Age: 61
End: 2021-05-18
Payer: COMMERCIAL

## 2021-05-18 VITALS
HEART RATE: 71 BPM | SYSTOLIC BLOOD PRESSURE: 143 MMHG | DIASTOLIC BLOOD PRESSURE: 83 MMHG | WEIGHT: 166 LBS | OXYGEN SATURATION: 97 % | HEIGHT: 62 IN | BODY MASS INDEX: 30.55 KG/M2 | TEMPERATURE: 97.4 F | RESPIRATION RATE: 18 BRPM

## 2021-05-18 DIAGNOSIS — B35.9 DERMATOPHYTOSIS, UNSPECIFIED: ICD-10-CM

## 2021-05-18 PROCEDURE — 99214 OFFICE O/P EST MOD 30 MIN: CPT

## 2021-05-18 PROCEDURE — 99072 ADDL SUPL MATRL&STAF TM PHE: CPT

## 2021-05-18 NOTE — HISTORY OF PRESENT ILLNESS
[Disease: _____________________] : Disease: [unfilled] [de-identified] : Mrs Puga is a 57 year old postmenopausal female who presents for transfer of care for history of Stage IA (Y4v-9fg N0M0) well differentiated intraductal ductal carcinoma of the right breast, ER/NE positive, Her 2 negative s/p right lumpectomy sentinel node biopsy (Dr. Barker) , radiation therapy ( Dr. Izquierdo) and receiving Arimidex since 4/8/2015, followed by Dr.Julie Cifuentes at [facility].   She also has a history of Stage I ([id number]) Grade 1 endometrial cancer and is status post PAULA/BSO with Dr. Rogel. She has a family history  notable for a mother with  ovarian cancer, metastatic renal cell cancer to the liver, a maternal grandmother with breast cancer in her 40s, and a half aunt with breast cancer.  Genetic testing for BRCA was negative in 2014. She was started on Boniva in 2014 when bone density at the time showed osteopenia (-1.7 L spine) and continues on it now with most recent bone density done on 08/17/2017 showing osteopenia but with T score -1.4 in the L spine and hip. She also had mammography in Aug of 2017 which she reports as normal.  She denies current complaint and tolerates Arimidex well.   [de-identified] : Patient presents today for follow up of BCA and endometrial cancer, she continues on Anastrazole with no issues.    \par \par BMD Jan 2020- Osteopenic \par Mammo/US April 2021-  new left calcifications in upper associated with lumpectomy- prob benign- breast MRI ordered\par \par MRI 5/17/2021\par IMPRESSION:\par  1. A 1 cm left upper inner quadrant linear enhancement at the lumpectomy is indeterminate for DCIS.\par The enhancement is concordant with the site of calcifications reported on recent mammography.\par Differential diagnosis includes fat necrosis. Stereotactic biopsy targeting the corresponding\par calcifications in the left breast is recommended.\par  2. A 2 cm right central linear enhancement without mammographic correlate is indeterminate for\par DCIS. MRI guided biopsy is recommended.\par  3. Prominent right axillary lymph nodes concordant with history of recent Covid vaccine. A positive\par MRI guided biopsy of the right breast would elevate suspicion. If the MRI guided biopsy of the\par breast lesion proves benign, then clinical correlation and follow-up ultrasound in 2 months would be\par recommended to reevaluate the right axillary lymph nodes.\par \par \par Last COVID vaccine April 6- Right Deltoid \par \par

## 2021-05-18 NOTE — CONSULT LETTER
[Dear  ___] : Dear  [unfilled], [Consult Letter:] : I had the pleasure of evaluating your patient, [unfilled]. [Please see my note below.] : Please see my note below. [Consult Closing:] : Thank you very much for allowing me to participate in the care of this patient.  If you have any questions, please do not hesitate to contact me. [Sincerely,] : Sincerely, [DrStephy  ___] : Dr. GOSS [FreeTextEntry3] : Licha Yuan MD\par [facility] [place] at [facility]\par

## 2021-05-18 NOTE — PHYSICAL EXAM
[Fully active, able to carry on all pre-disease performance without restriction] : Status 0 - Fully active, able to carry on all pre-disease performance without restriction [Normal] : affect appropriate [de-identified] : left breast - lumpectomy scar, left axilla rash

## 2021-05-18 NOTE — ASSESSMENT
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 Lennon, colonoscopy Nov 2020- 4 mm hyperplastic sigmoid polyp  repeat in 1 year \par - CT scan abd/ pelvis July 2020- BERNADINE \par - continue close surveillance\par \par Breast cancer 2015\par Stage Ia ( T1a) 5mm, ER/ [place] positive, HER 2 kassy not amplified. \par Anastrozole  1mg PO daily x 5 years - continue \par Mammogram Jan 2020- due now, will consider MRI after 6 m\par Needs mammogram Oct 2021\par - schedule left breast biopsy US stereotactic \par - left breast linear enhancement - MRI guided biopsy\par - breast surgery referral \par \par Osteopenia\par last bone density Jan 2020\par T-score -2.3- no change \par Risk factors: postmenopausal, AI\par Recommended:\par 1. Vitamin D\par 2. Calcium supplement 500mg\par 3. Weight bearing exercises\par Prolia - not covered by insurance \par Dental evaluation done \par \par Discussed weight control and healthy eating habits.  Encourage to participate in moderate exercise.\par \par Uterine cancer 2013 - stage I, FIGO grade 1, endometrioid, s/p TAHBSO\par \par \par labs : UA,  CBC,Chem Profile, CEA,,CA27-29,\par

## 2021-06-01 ENCOUNTER — RESULT REVIEW (OUTPATIENT)
Age: 61
End: 2021-06-01

## 2021-07-13 ENCOUNTER — RESULT REVIEW (OUTPATIENT)
Age: 61
End: 2021-07-13

## 2021-07-13 ENCOUNTER — APPOINTMENT (OUTPATIENT)
Dept: HEMATOLOGY ONCOLOGY | Facility: CLINIC | Age: 61
End: 2021-07-13
Payer: COMMERCIAL

## 2021-07-13 VITALS
SYSTOLIC BLOOD PRESSURE: 137 MMHG | RESPIRATION RATE: 18 BRPM | TEMPERATURE: 98 F | DIASTOLIC BLOOD PRESSURE: 84 MMHG | HEIGHT: 62.01 IN | BODY MASS INDEX: 30.71 KG/M2 | HEART RATE: 81 BPM | WEIGHT: 169 LBS | OXYGEN SATURATION: 96 %

## 2021-07-13 DIAGNOSIS — L03.90 CELLULITIS, UNSPECIFIED: ICD-10-CM

## 2021-07-13 PROCEDURE — 99072 ADDL SUPL MATRL&STAF TM PHE: CPT

## 2021-07-13 PROCEDURE — 99214 OFFICE O/P EST MOD 30 MIN: CPT

## 2021-07-13 NOTE — HISTORY OF PRESENT ILLNESS
[Disease: _____________________] : Disease: [unfilled] [de-identified] : Mrs Puga is a 57 year old postmenopausal female who presents for transfer of care for history of Stage IA (I1b-9rt N0M0) well differentiated intraductal ductal carcinoma of the right breast, ER/[place] positive, Her 2 negative s/p right lumpectomy sentinel node biopsy (Dr. Barker) , radiation therapy ( Dr. Izquierdo) and receiving Arimidex since 4/8/2015, followed by Dr.Julie Cifuentes at [facility].   She also has a history of Stage I ([id number]) Grade 1 endometrial cancer and is status post PAULA/BSO with Dr. Rogel. She has a family history  notable for a mother with  ovarian cancer, metastatic renal cell cancer to the liver, a maternal grandmother with breast cancer in her 40s, and a half aunt with breast cancer.  Genetic testing for BRCA was negative in 2014. She was started on Boniva in 2014 when bone density at the time showed osteopenia (-1.7 L spine) and continues on it now with most recent bone density done on 08/17/2017 showing osteopenia but with T score -1.4 in the L spine and hip. She also had mammography in Aug of 2017 which she reports as normal.  She denies current complaint and tolerates Arimidex well.   [de-identified] : Patient presents today for follow up of BCA and endometrial cancer, she continues on Anastrazole with no issues.    \par \par BMD Jan 2020- Osteopenic \par Mammo/US April 2021-  new left calcifications in upper associated with lumpectomy- prob benign- breast MRI ordered\par \par MRI 5/17/2021\par IMPRESSION:\par  1. A 1 cm left upper inner quadrant linear enhancement at the lumpectomy is indeterminate for DCIS.\par The enhancement is concordant with the site of calcifications reported on recent mammography.\par Differential diagnosis includes fat necrosis. Stereotactic biopsy targeting the corresponding\par calcifications in the left breast is recommended.\par  2. A 2 cm right central linear enhancement without mammographic correlate is indeterminate for\par DCIS. MRI guided biopsy is recommended.\par  3. Prominent right axillary lymph nodes concordant with history of recent Covid vaccine. A positive\par MRI guided biopsy of the right breast would elevate suspicion. If the MRI guided biopsy of the\par breast lesion proves benign, then clinical correlation and follow-up ultrasound in 2 months would be\par recommended to reevaluate the right axillary lymph nodes.\par \par \par Last COVID vaccine April 6- Right Deltoid \par \par

## 2021-07-13 NOTE — PHYSICAL EXAM
[Fully active, able to carry on all pre-disease performance without restriction] : Status 0 - Fully active, able to carry on all pre-disease performance without restriction [Normal] : affect appropriate [de-identified] : left breast - lumpectomy scar, left axilla rash

## 2021-07-13 NOTE — ASSESSMENT
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 ( [facility])\par - reviewed with patient results and indication for surveillance\par - screening for colon cancer- last colonoscopy 10/2017, 11/202,\par - EGD to screen for gastric cancer, 2020, then every 2-5 years\par - UA to screen for hematuria for urothelial cancer- yearly - done today \par - s/p TAHBSO 2013 for endometrial cancer- monitor Ca-125\par - Breast cancer 2015-left breast  lumpectomy, radiation, on Anastrozole, continue.( [facility]) \par - indicated for patient family testing \par - colonoscopy July 2020- sessile polyp - needs follow in 3 months, d/w Dr. Lennon, colonoscopy Nov 2020- 4 mm hyperplastic sigmoid polyp  repeat in 1 year \par - CT scan abd/ pelvis July 2020- BERNADINE \par - continue close surveillance\par \par Breast cancer 2015\par Stage Ia ( T1a) 5mm, ER/ [place] positive, HER 2 kassy not amplified. \par Anastrozole  1mg PO daily x 5 years - continue \par Mammogram Jan 2020- due now, will consider MRI after 6 m\par Needs mammogram Oct 2021\par - schedule left breast biopsy US stereotactic \par - left breast linear enhancement - MRI guided biopsy\par - breast surgery referral \par \par Osteopenia\par last bone density Jan 2020\par T-score -2.3- no change \par Risk factors: postmenopausal, AI\par Recommended:\par 1. Vitamin D\par 2. Calcium supplement 500mg\par 3. Weight bearing exercises\par Prolia - not covered by insurance \par Dental evaluation done \par \par Discussed weight control and healthy eating habits.  Encourage to participate in moderate exercise.\par \par Uterine cancer 2013 - stage I, FIGO grade 1, endometrioid, s/p TAHBSO\par \par \par 7/13/2021\par Patient underwent biopsy b/l breast based on MRI findings\par Pathology benign findings -	ORGANIZING FAT NECROSIS WITH FIBROSIS AND LARGE CALCIFICATIONS \par Biopsy complicated by infection by infection - overlap of bacterial and fungal.  Reviewed with ID - plan for bactroban and topical antifungal. Follow up 7- 10 days, will call if no improvement . Referred to breast surgeon Dr. Benavides.\par \par labs : UA,  CBC,Chem Profile, CEA,,CA27-29,\par

## 2021-07-13 NOTE — CONSULT LETTER
[Dear  ___] : Dear  [unfilled], [Consult Letter:] : I had the pleasure of evaluating your patient, [unfilled]. [Please see my note below.] : Please see my note below. [Consult Closing:] : Thank you very much for allowing me to participate in the care of this patient.  If you have any questions, please do not hesitate to contact me. [Sincerely,] : Sincerely, [DrStephy  ___] : Dr. GOSS [FreeTextEntry3] : iLcha Yuan MD\par [facility] [place] at [facility]\par

## 2021-07-14 ENCOUNTER — NON-APPOINTMENT (OUTPATIENT)
Age: 61
End: 2021-07-14

## 2021-07-15 ENCOUNTER — APPOINTMENT (OUTPATIENT)
Dept: BREAST CENTER | Facility: CLINIC | Age: 61
End: 2021-07-15
Payer: COMMERCIAL

## 2021-07-15 VITALS
OXYGEN SATURATION: 98 % | WEIGHT: 169 LBS | SYSTOLIC BLOOD PRESSURE: 125 MMHG | BODY MASS INDEX: 31.1 KG/M2 | HEART RATE: 100 BPM | HEIGHT: 62 IN | DIASTOLIC BLOOD PRESSURE: 85 MMHG

## 2021-07-15 DIAGNOSIS — Z80.41 FAMILY HISTORY OF MALIGNANT NEOPLASM OF OVARY: ICD-10-CM

## 2021-07-15 DIAGNOSIS — N60.11 DIFFUSE CYSTIC MASTOPATHY OF LEFT BREAST: ICD-10-CM

## 2021-07-15 DIAGNOSIS — Z80.3 FAMILY HISTORY OF MALIGNANT NEOPLASM OF BREAST: ICD-10-CM

## 2021-07-15 DIAGNOSIS — N60.12 DIFFUSE CYSTIC MASTOPATHY OF LEFT BREAST: ICD-10-CM

## 2021-07-15 PROCEDURE — 99204 OFFICE O/P NEW MOD 45 MIN: CPT

## 2021-07-15 PROCEDURE — 99072 ADDL SUPL MATRL&STAF TM PHE: CPT

## 2021-07-15 NOTE — PAST MEDICAL HISTORY
[Menarche Age ____] : age at menarche was [unfilled] [Menopause Age____] : age at menopause was [unfilled] [Total Preg ___] : G[unfilled] [Live Births ___] : P[unfilled]  [Premature ___] : Premature: [unfilled] [Age At Live Birth ___] : Age at live birth: [unfilled] [History of Hormone Replacement Treatment] : has no history of hormone replacement treatment

## 2021-07-15 NOTE — HISTORY OF PRESENT ILLNESS
[FreeTextEntry1] : The patient is a 60-year-old G3, P2 postmenopausal white female with [place] Canadian and English descent.  She underwent menarche at age 11 and had a first child at age 28.  She never took any hormone replacement therapy after undergoing menopause in 2013 at which time she had a PAULA/BSO for endometrial cancer.  She has a strong family history with her mother who developed ovarian cancer and metastatic renal cell cancer to the liver.  Her maternal grandmother had breast cancer in her 40s and she has a half aunt who had breast cancer.  The patient has the personal history of the endometrial cancer 2013 for which she underwent a PAULA/BSO by Dr. Rogel.  She later developed a left breast cancer and underwent a partial mastectomy and sentinel lymph node biopsy for an ER/IN positive HER-2/kassy negative invasive duct cancer performed by Dr. Roma Barker and she received radiation therapy and was placed on Arimidex.  The patient BRCA tested in 2014 which was negative and then later had [employer] panel testing around 2017 and was found to have an MSH6 Beth syndrome mutation.  Has been getting routine colonoscopies which have been negative.  She was doing well but developed some new left breast upper inner quadrant calcifications seen around the lumpectomy scar on mammography in April 2021.  These persisted on diagnostic views and MRI also showed some right central breast linear enhancement and she underwent a right breast central MRI biopsy and left breast upper inner quadrant stereotactic biopsy at [facility] on June 1, 2021.  The left breast biopsies just showed organized fat necrosis and fibrosis in the right breast central MRI core biopsy showed benign dilated sclerotic ducts and chronic inflammation.  The patient developed drainage from the biopsy sites almost immediately and has developed a pruritic red rash around each biopsy site worse on the left breast than the right.  She was initially placed on topical antibiotics without much relief and has been placed on ciclopirox by Dr. Yuan which the patient's been using over the last 2 days with some improvement.  She comes in now for a surgical evaluation

## 2021-07-15 NOTE — ASSESSMENT
[FreeTextEntry1] : The patient is a 60-year-old G3, P2 postmenopausal white female with [place] and English descent.  She underwent menarche at age 11 and had a first child at age 28.  She never took any hormone replacement therapy after undergoing menopause in 2013 at which time she had a PAULA/BSO for endometrial cancer.  She has a strong family history with her mother who developed ovarian cancer and metastatic renal cell cancer to the liver.  Her maternal grandmother had breast cancer in her 40s and she has a half aunt who had breast cancer.  The patient has the personal history of the endometrial cancer 2013 for which she underwent a PAULA/BSO by Dr. Rogel.  She later developed a left breast cancer and underwent a partial mastectomy and sentinel lymph node biopsy for an ER/[place] positive HER-2/kassy negative invasive duct cancer performed by Dr. Roma Barker and she received radiation therapy and was placed on Arimidex.  The patient BRCA tested in 2014 which was negative and then later had [employer] panel testing around 2017 and was found to have an MSH6 Beth syndrome mutation.  Has been getting routine colonoscopies which have been negative.  She was doing well but developed some new left breast upper inner quadrant calcifications seen around the lumpectomy scar on mammography in April 2021.  These persisted on diagnostic views and MRI also showed some right central breast linear enhancement and she underwent a right breast central MRI biopsy and left breast upper inner quadrant stereotactic biopsy at [facility] on June 1, 2021.  The left breast biopsies just showed organized fat necrosis and fibrosis in the right breast central MRI core biopsy showed benign dilated sclerotic ducts and chronic inflammation.  The patient developed drainage from the biopsy sites almost immediately and has developed a pruritic red rash around each biopsy site worse on the left breast than the right.  She was initially placed on topical antibiotics without much relief and has been placed on ciclopirox by Dr. Yuan which the patient's been using over the last 2 days with some improvement.  On exam, I cannot feel any suspicious masses in either breast however she does have these bilateral skin rashes on the medial aspect of the left breast extending into the inframammary fold and on the lateral aspect of the right breast with a separate scaly lesion on the right superior areolar.  None of these appear to be malignant in nature and are related likely to the recent biopsy and appear to be fungal in nature.  I agree with the use of ciclopirox antifungal cream and this should hopefully resolve the rash.  I would like the patient follow-up again in 2 months just to reevaluate this however.  I would like her to get a bilateral diagnostic mammography and ultrasound at the end of the year around December 2021 just to reevaluate her breasts given the recent bilateral biopsies.  She will continue on Arimidex and continue follow-up with Dr. Yuan

## 2021-07-15 NOTE — REASON FOR VISIT
[Initial Evaluation] : an initial evaluation [FreeTextEntry1] : The patient comes in with a strong family history of breast and ovarian cancer and a personal history of endometrial cancer diagnosed in 2013 for which she underwent a PAULA/BSO and then a left breast cancer diagnosed in 2015 for which she underwent a partial mastectomy and sentinel lymph node biopsy for an invasive duct cancer which was ER/MT positive HER-2/kassy negative performed by Dr. Barker for which she underwent radiation therapy and was placed on Arimidex.  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  She is followed by Dr. Yuan and had a recent abnormal mammography with calcifications in the left breast upper inner quadrant and MRI finding in the right central region for which she underwent a stereotactic biopsy of the left breast and MRI biopsy of the right breast on June 1, 2021 and these were benign.  She unfortunately developed drainage from both of the biopsy sites and developed a patchy pruritic rash around both sites in the left breast more than the right.  She was initially given topical antibiotics but more recently switched to a topical antifungal, ciclopirox, and she comes in now for surgical evaluation.

## 2021-07-15 NOTE — PHYSICAL EXAM
[Normocephalic] : normocephalic [Atraumatic] : atraumatic [EOMI] : extra ocular movement intact [Supple] : supple [No Supraclavicular Adenopathy] : no supraclavicular adenopathy [No Cervical Adenopathy] : no cervical adenopathy [Examined in the supine and seated position] : examined in the supine and seated position [No dominant masses] : no dominant masses in right breast  [No dominant masses] : no dominant masses left breast [No Nipple Retraction] : no left nipple retraction [No Nipple Discharge] : no left nipple discharge [Breast Mass Right Breast ___cm] : no masses [Breast Mass Left Breast ___cm] : no masses [Breast Nipple Inversion] : nipples not inverted [Breast Nipple Retraction] : nipples not retracted [Breast Nipple Flattening] : nipples not flattened [Breast Nipple Fissures] : nipples not fissured [Breast Abnormal Lactation (Galactorrhea)] : no galactorrhea [Breast Abnormal Secretion Bloody Fluid] : no bloody discharge [Breast Abnormal Secretion Serous Fluid] : no serous discharge [Breast Abnormal Secretion Opalescent Fluid] : no milky discharge [No Axillary Lymphadenopathy] : no left axillary lymphadenopathy [No Edema] : no edema [No Ulceration] : no ulceration [de-identified] : On exam, the patient has ptotic D-cup breasts.  She has an obvious pruritic rash around the inner aspect of the left breast and lateral aspect of the right breast.  The left breast rash is much worse than the right and extends into the inframammary crease.  This definitely appears fungal in nature and I agree with the use of ciclopirox.  She has no suspicious breast masses however and no axillary, supraclavicular, or cervical adenopathy. [de-identified] : Slight pruritic rash and scaly right breast areolar skin lesion with a benign appearance [de-identified] : Pruritic papillary medial rash extending into the inframammary fold [de-identified] : Bilateral skin rashes with a papular appearance which are pruritic bilaterally

## 2022-01-20 ENCOUNTER — RESULT REVIEW (OUTPATIENT)
Age: 62
End: 2022-01-20

## 2022-01-20 ENCOUNTER — APPOINTMENT (OUTPATIENT)
Dept: HEMATOLOGY ONCOLOGY | Facility: CLINIC | Age: 62
End: 2022-01-20
Payer: COMMERCIAL

## 2022-01-20 VITALS
HEIGHT: 62 IN | HEART RATE: 78 BPM | TEMPERATURE: 97.9 F | SYSTOLIC BLOOD PRESSURE: 130 MMHG | DIASTOLIC BLOOD PRESSURE: 82 MMHG | BODY MASS INDEX: 30.18 KG/M2 | WEIGHT: 164 LBS | OXYGEN SATURATION: 98 % | RESPIRATION RATE: 16 BRPM

## 2022-01-20 PROCEDURE — 36415 COLL VENOUS BLD VENIPUNCTURE: CPT

## 2022-01-20 PROCEDURE — 99214 OFFICE O/P EST MOD 30 MIN: CPT

## 2022-01-20 NOTE — PHYSICAL EXAM
[Fully active, able to carry on all pre-disease performance without restriction] : Status 0 - Fully active, able to carry on all pre-disease performance without restriction [Normal] : affect appropriate [de-identified] : left breast - lumpectomy scar, left axilla rash

## 2022-01-20 NOTE — HISTORY OF PRESENT ILLNESS
[Disease: _____________________] : Disease: [unfilled] [de-identified] : Mrs Puga is a 57 year old postmenopausal female who presents for transfer of care for history of Stage IA (F2b-8kj N0M0) well differentiated intraductal ductal carcinoma of the right breast, ER/SD positive, Her 2 negative s/p right lumpectomy sentinel node biopsy (Dr. Barker) , radiation therapy ( Dr. Izquierdo) and receiving Arimidex since 4/8/2015, followed by Dr.Julie Cifuentes at [facility].   She also has a history of Stage I ([id number]) Grade 1 endometrial cancer and is status post PAULA/BSO with Dr. Rogel. She has a family history  notable for a mother with  ovarian cancer, metastatic renal cell cancer to the liver, a maternal grandmother with breast cancer in her 40s, and a half aunt with breast cancer.  Genetic testing for BRCA was negative in 2014. She was started on Boniva in 2014 when bone density at the time showed osteopenia (-1.7 L spine) and continues on it now with most recent bone density done on 08/17/2017 showing osteopenia but with T score -1.4 in the L spine and hip. She also had mammography in Aug of 2017 which she reports as normal.  She denies current complaint and tolerates Arimidex well.   [de-identified] : Patient presents today for follow up of BCA and endometrial cancer, she continues on Anastrazole with no issues.    \par \par BMD Jan 2020- Osteopenic \par Overdue for Mammo and MRI \par \par

## 2022-01-20 NOTE — CONSULT LETTER
[Dear  ___] : Dear  [unfilled], [Consult Letter:] : I had the pleasure of evaluating your patient, [unfilled]. [Please see my note below.] : Please see my note below. [Consult Closing:] : Thank you very much for allowing me to participate in the care of this patient.  If you have any questions, please do not hesitate to contact me. [Sincerely,] : Sincerely, [DrStephy  ___] : Dr. GOSS [FreeTextEntry3] : Licha Yuan MD\par [facility] [place] at [facility]\par

## 2022-01-20 NOTE — ASSESSMENT
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 ( [facility])\par - reviewed with patient results and indication for surveillance\par - screening for colon cancer- last colonoscopy 10/2017, 11/202,\par - EGD to screen for gastric cancer, 2020, then every 2-5 years\par - UA to screen for hematuria for urothelial cancer- yearly - done today \par - s/p TAHBSO 2013 for endometrial cancer- monitor Ca-125\par - Breast cancer 2015-left breast  lumpectomy, radiation, on Anastrozole, continue.( [facility]) \par - indicated for patient family testing \par - colonoscopy July 2020- sessile polyp - needs follow in 3 months, d/w Dr. Lennon, colonoscopy Nov 2020- 4 mm hyperplastic sigmoid polyp  repeat in 1 year \par - CT scan abd/ pelvis July 2020- BERNADINE \par - continue close surveillance\par \par Breast cancer 2015\par Stage Ia ( T1a) 5mm, ER/ [place] positive, HER 2 kassy not amplified. \par Anastrozole  1mg PO daily x 5 years - continue \par Mammogram Jan 2020- due now, will consider MRI after 6 m\par Needs mammogram Oct 2021\par - schedule left breast biopsy US stereotactic \par - left breast linear enhancement - MRI guided biopsy\par - breast surgery referral \par \par Osteopenia\par last bone density Jan 2020\par T-score -2.3- no change \par Risk factors: postmenopausal, AI\par Recommended:\par 1. Vitamin D\par 2. Calcium supplement 500mg\par 3. Weight bearing exercises\par Prolia - not covered by insurance \par Dental evaluation done \par \par Discussed weight control and healthy eating habits.  Encourage to participate in moderate exercise.\par \par Uterine cancer 2013 - stage I, FIGO grade 1, endometrioid, s/p TAHBSO\par \par \par 7/13/2021\par Patient underwent biopsy b/l breast based on MRI findings\par Pathology benign findings -	ORGANIZING FAT NECROSIS WITH FIBROSIS AND LARGE CALCIFICATIONS \par Biopsy complicated by infection by infection - overlap of bacterial and fungal.  Reviewed with ID - plan for bactroban and topical antifungal. Follow up 7- 10 days, will call if no improvement . Referred to breast surgeon Dr. Benavides.\par \par 1/20/2022\par Stage Ia ( T1a) 5mm, ER/ NH positive, HER 2 kassy not amplified. \par Anastrozole  1mg PO daily x 5 years- tolerates well\par Breast biopsy June 2021- benign\par overdue for breast imaging- mammo/US and MRI- will schedule now  \par Prolia today- due for BMD\par overdue for GI/Colonoscopy- Beth syndrome- stressed importance of routine f/u\par \par labs-CBC,Chem Profile, CEA,,[id number],- case and mgmt discussed with Dr. Yuan- return in 6 months \par

## 2022-02-03 ENCOUNTER — RESULT REVIEW (OUTPATIENT)
Age: 62
End: 2022-02-03

## 2022-02-28 ENCOUNTER — RESULT REVIEW (OUTPATIENT)
Age: 62
End: 2022-02-28

## 2022-06-15 ENCOUNTER — APPOINTMENT (OUTPATIENT)
Dept: GASTROENTEROLOGY | Facility: CLINIC | Age: 62
End: 2022-06-15
Payer: COMMERCIAL

## 2022-06-15 VITALS
OXYGEN SATURATION: 98 % | WEIGHT: 164 LBS | HEART RATE: 65 BPM | HEIGHT: 62 IN | SYSTOLIC BLOOD PRESSURE: 120 MMHG | BODY MASS INDEX: 30.18 KG/M2 | DIASTOLIC BLOOD PRESSURE: 80 MMHG

## 2022-06-15 DIAGNOSIS — G89.29 RIGHT LOWER QUADRANT PAIN: ICD-10-CM

## 2022-06-15 DIAGNOSIS — Z12.11 ENCOUNTER FOR SCREENING FOR MALIGNANT NEOPLASM OF COLON: ICD-10-CM

## 2022-06-15 DIAGNOSIS — R10.31 RIGHT LOWER QUADRANT PAIN: ICD-10-CM

## 2022-06-15 DIAGNOSIS — Z12.0 ENCOUNTER FOR SCREENING FOR MALIGNANT NEOPLASM OF STOMACH: ICD-10-CM

## 2022-06-15 DIAGNOSIS — K62.89 OTHER SPECIFIED DISEASES OF ANUS AND RECTUM: ICD-10-CM

## 2022-06-15 LAB
ALBUMIN SERPL ELPH-MCNC: 4.4 G/DL
ALP BLD-CCNC: 62 U/L
ALT SERPL-CCNC: 9 U/L
ANION GAP SERPL CALC-SCNC: 13 MMOL/L
AST SERPL-CCNC: 15 U/L
BASOPHILS # BLD AUTO: 0.04 K/UL
BASOPHILS NFR BLD AUTO: 0.8 %
BILIRUB SERPL-MCNC: 0.3 MG/DL
BUN SERPL-MCNC: 12 MG/DL
CALCIUM SERPL-MCNC: 9.8 MG/DL
CHLORIDE SERPL-SCNC: 103 MMOL/L
CO2 SERPL-SCNC: 25 MMOL/L
CREAT SERPL-MCNC: 0.77 MG/DL
EGFR: 87 ML/MIN/1.73M2
EOSINOPHIL # BLD AUTO: 0.17 K/UL
EOSINOPHIL NFR BLD AUTO: 3.5 %
GLUCOSE SERPL-MCNC: 88 MG/DL
HCT VFR BLD CALC: 40.9 %
HGB BLD-MCNC: 12.9 G/DL
IMM GRANULOCYTES NFR BLD AUTO: 0.4 %
LPL SERPL-CCNC: 28 U/L
LYMPHOCYTES # BLD AUTO: 1.35 K/UL
LYMPHOCYTES NFR BLD AUTO: 28 %
MAN DIFF?: NORMAL
MCHC RBC-ENTMCNC: 28.9 PG
MCHC RBC-ENTMCNC: 31.5 GM/DL
MCV RBC AUTO: 91.7 FL
MONOCYTES # BLD AUTO: 0.42 K/UL
MONOCYTES NFR BLD AUTO: 8.7 %
NEUTROPHILS # BLD AUTO: 2.83 K/UL
NEUTROPHILS NFR BLD AUTO: 58.6 %
PLATELET # BLD AUTO: 315 K/UL
POTASSIUM SERPL-SCNC: 4.8 MMOL/L
PROT SERPL-MCNC: 7.1 G/DL
RBC # BLD: 4.46 M/UL
RBC # FLD: 14.1 %
SODIUM SERPL-SCNC: 141 MMOL/L
WBC # FLD AUTO: 4.83 K/UL

## 2022-06-15 PROCEDURE — 99204 OFFICE O/P NEW MOD 45 MIN: CPT

## 2022-06-15 RX ORDER — CICLOPIROX OLAMINE 7.7 MG/G
0.77 CREAM TOPICAL TWICE DAILY
Qty: 1 | Refills: 2 | Status: DISCONTINUED | COMMUNITY
Start: 2021-07-13 | End: 2022-06-15

## 2022-06-15 RX ORDER — METHYLPREDNISOLONE 32 MG/1
32 TABLET ORAL
Qty: 2 | Refills: 0 | Status: DISCONTINUED | COMMUNITY
Start: 2020-06-29 | End: 2022-06-15

## 2022-06-15 RX ORDER — MUPIROCIN 2 G/100G
2 CREAM TOPICAL TWICE DAILY
Qty: 1 | Refills: 0 | Status: DISCONTINUED | COMMUNITY
Start: 2021-07-13 | End: 2022-06-15

## 2022-06-15 RX ORDER — NYSTATIN 100000 1/G
100000 POWDER TOPICAL TWICE DAILY
Qty: 1 | Refills: 4 | Status: DISCONTINUED | COMMUNITY
Start: 2021-05-18 | End: 2022-06-15

## 2022-06-15 RX ORDER — FLUOCINONIDE 0.5 MG/G
0.05 CREAM TOPICAL
Qty: 60 | Refills: 0 | Status: DISCONTINUED | COMMUNITY
Start: 2020-01-20 | End: 2022-06-15

## 2022-06-15 NOTE — HISTORY OF PRESENT ILLNESS
[FreeTextEntry1] : 62 yo f with hypothyroidism, osteoporosis, h/o breast ca s/p left lumpectomy and xrt, Beth Sx, endometrial ca s/p PAULA BSO, family h/o colon and endometrial ca, s/p csection, s/p rotator cuff surgery presents for evaluation of abdominal pain. \par \par 2 weeks of cramping in her lower abdomen/pelvis. \par Sharp knife like pain, comes and goes, \par also associated with pain in her rectum, pain when passing a bm or gas. \par She took Dulocolax last weekend which resulted in multiple bm  but then pain resumed. \par She has had a lot of burping in past 2 weeks. \par She started a diet 2 months ago when ate a lot solids and lost 20 lbs, she now eating more normally except for avoiding desserts. \par \par She gets prolia shots q 6 months. \par She has new irritation at her csection scar, using clomitrazole cream given by gyn. \par \par \par She is due for EGD/Colonoscopy. \par EGD/Colonoscopy performed 11/2020- \par EGD with side viewer- normal papilla. \par \par normal visualized TI, scar in Ascending colon, thickened fold in ascending colon, 4 mm sigmoid polyp, diverticulosis, hemorrhoids\par path- nonadenomatous changes\par \par recall for egd in 2 years\par recall for colonoscopy in 1 year \par \par EGD/Colonoscopy 07/2020\par EGD prominent papilla\par Colonoscopy 1 cm cecal polyp (SSP), 2 cm ascending colon polyp s/p EMR (TA), 7 mm descending colon polyp  (TA)\par \par        She saw Dr. Yuan in 04/2018 and genetic testing showed that she has Beth syndrome with LSH6 mutation. \par        She was recommended to have colonoscopy q 1-2 years, EGD every 2-5 years\par \par        Colonoscopy 10/23/2017 for screening- \par        normal TI, hyperplastic descending colon polyp, diverticulosis, large internal hemorrhoids on retroflexion. \par \par EGD 07/2018- reactive gastropathy, gastric fundic gland polyp. \par EGD with side viewer 08/2018- unremarkable \par

## 2022-06-15 NOTE — ASSESSMENT
[FreeTextEntry1] : Abd pain a/w rectal pain\par -labs, CT A/P\par -MiraLAX daily\par \par Beth Syndrome- \par patient due for colon cancer screening and gastric cancer screening with Colonoscopy/EGD. \par will be scheduled pending Labs/CT as above.

## 2022-06-15 NOTE — PHYSICAL EXAM
[General Appearance - Alert] : alert [General Appearance - In No Acute Distress] : in no acute distress [Sclera] : the sclera and conjunctiva were normal [Outer Ear] : the ears and nose were normal in appearance [Hearing Threshold Finger Rub Not Green] : hearing was normal [Neck Appearance] : the appearance of the neck was normal [] : no respiratory distress [Apical Impulse] : the apical impulse was normal [Abdomen Soft] : soft [No CVA Tenderness] : no ~M costovertebral angle tenderness [Abnormal Walk] : normal gait [Skin Color & Pigmentation] : normal skin color and pigmentation [Cranial Nerves] : cranial nerves 2-12 were intact [Oriented To Time, Place, And Person] : oriented to person, place, and time [Impaired Insight] : insight and judgment were intact [Affect] : the affect was normal [Mood] : the mood was normal [FreeTextEntry1] : normal external exam, limited LAURY due to pain, no stool in vault, guaiac negative, controls verified, chaperoned by Concepcion Meehan MA

## 2022-06-21 ENCOUNTER — RESULT REVIEW (OUTPATIENT)
Age: 62
End: 2022-06-21

## 2022-07-14 DIAGNOSIS — Z12.39 ENCOUNTER FOR OTHER SCREENING FOR MALIGNANT NEOPLASM OF BREAST: ICD-10-CM

## 2022-07-16 ENCOUNTER — RESULT REVIEW (OUTPATIENT)
Age: 62
End: 2022-07-16

## 2022-07-18 ENCOUNTER — RESULT REVIEW (OUTPATIENT)
Age: 62
End: 2022-07-18

## 2022-07-19 ENCOUNTER — APPOINTMENT (OUTPATIENT)
Dept: GASTROENTEROLOGY | Facility: HOSPITAL | Age: 62
End: 2022-07-19

## 2022-07-21 ENCOUNTER — RESULT REVIEW (OUTPATIENT)
Age: 62
End: 2022-07-21

## 2022-07-21 ENCOUNTER — APPOINTMENT (OUTPATIENT)
Dept: HEMATOLOGY ONCOLOGY | Facility: CLINIC | Age: 62
End: 2022-07-21

## 2022-07-21 VITALS
HEIGHT: 62 IN | RESPIRATION RATE: 16 BRPM | SYSTOLIC BLOOD PRESSURE: 120 MMHG | TEMPERATURE: 96.7 F | DIASTOLIC BLOOD PRESSURE: 74 MMHG | HEART RATE: 70 BPM | OXYGEN SATURATION: 99 % | WEIGHT: 146.19 LBS | BODY MASS INDEX: 26.9 KG/M2

## 2022-07-21 DIAGNOSIS — R92.2 INCONCLUSIVE MAMMOGRAM: ICD-10-CM

## 2022-07-21 PROCEDURE — 99214 OFFICE O/P EST MOD 30 MIN: CPT | Mod: 25

## 2022-07-21 PROCEDURE — 36415 COLL VENOUS BLD VENIPUNCTURE: CPT

## 2022-08-01 ENCOUNTER — RESULT REVIEW (OUTPATIENT)
Age: 62
End: 2022-08-01

## 2022-08-14 PROBLEM — R92.2 DENSE BREAST TISSUE ON MAMMOGRAM: Status: ACTIVE | Noted: 2021-07-15

## 2022-08-14 NOTE — CONSULT LETTER
[Dear  ___] : Dear  [unfilled], [Consult Letter:] : I had the pleasure of evaluating your patient, [unfilled]. [Please see my note below.] : Please see my note below. [Consult Closing:] : Thank you very much for allowing me to participate in the care of this patient.  If you have any questions, please do not hesitate to contact me. [Sincerely,] : Sincerely, [DrStephy  ___] : Dr. GOSS [FreeTextEntry3] : Licha Yuan MD\par [facility] [place] at [facility]\par

## 2022-08-14 NOTE — HISTORY OF PRESENT ILLNESS
[Disease: _____________________] : Disease: [unfilled] [de-identified] : Mrs Puga is a 57 year old postmenopausal female who presents for transfer of care for history of Stage IA (M1h-9ok N0M0) well differentiated intraductal ductal carcinoma of the right breast, ER/[place] positive, Her 2 negative s/p right lumpectomy sentinel node biopsy (Dr. Barker) , radiation therapy ( Dr. Izquierdo) and receiving Arimidex since 4/8/2015, followed by Dr.Julie Cifuentes at [facility].   She also has a history of Stage I (W8yByP1) Grade 1 endometrial cancer and is status post PAULA/BSO with Dr. Rogel. She has a family history  notable for a mother with  ovarian cancer, metastatic renal cell cancer to the liver, a maternal grandmother with breast cancer in her 40s, and a half aunt with breast cancer.  Genetic testing for BRCA was negative in 2014. She was started on Boniva in 2014 when bone density at the time showed osteopenia (-1.7 L spine) and continues on it now with most recent bone density done on 08/17/2017 showing osteopenia but with T score -1.4 in the L spine and hip. She also had mammography in Aug of 2017 which she reports as normal.  She denies current complaint and tolerates Arimidex well.   [de-identified] : Patient presents today for follow up of BCA and endometrial cancer, she stopped taking the anastrozole 3 weeks ago due to sleeping issues. Patient feels well overall but a month ago she was having severe abdominal pain and consulted  who sent her for a CT scan which was negative.\par Last scans: \par Mammogram/US: Feb 28th, 2022\par DEXA: 01/14/2020\par MRI Breast: 02/03/2022\par 7/19/2022: Colonoscopy:

## 2022-08-14 NOTE — ASSESSMENT
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 ( [facility])\par - reviewed with patient results and indication for surveillance\par - screening for colon cancer- last colonoscopy 10/2017, 11/202,\par - EGD to screen for gastric cancer, 2020, then every 2-5 years\par - UA to screen for hematuria for urothelial cancer- yearly - done today \par - s/p TAHBSO 2013 for endometrial cancer- monitor Ca-125\par - Breast cancer 2015-left breast  lumpectomy, radiation, on Anastrozole, continue.( [facility]) \par - indicated for patient family testing \par - colonoscopy July 2020- sessile polyp - needs follow in 3 months, d/w Dr. Lennon, colonoscopy Nov 2020- 4 mm hyperplastic sigmoid polyp  repeat in 1 year \par - CT scan abd/ pelvis July 2020- BERNADINE \par - continue close surveillance\par \par # Osteopenia\par last bone density Jan 2020\par T-score -2.3- no change \par Risk factors: postmenopausal, AI\par Recommended:\par 1. Vitamin D\par 2. Calcium supplement 500mg\par 3. Weight bearing exercises\par Prolia - # 3 - 7/21/22\par Dental evaluation done \par \par Discussed weight control and healthy eating habits.  Encourage to participate in moderate exercise.\par \par # Uterine cancer 2013 - stage I, FIGO grade 1, endometrioid, s/p TAHBSO\par \par # Breast cancer\par Stage Ia ( T1a) 5mm, ER/ [place] positive, HER 2 kassy not amplified. \par Anastrozole  1mg PO daily x 7 years-taking [id number]\par Breast biopsy June 2021- benign\par  breast imaging- mammo/US and MRI- 2/28/22- Mammogram WNL, MRI Feb 2022- BERNADINE\par Prolia today- due for BMD\par overdue for GI/Colonoscopy- Beth syndrome- stressed importance of routine f/u\par \par # Beth syndrome\par - UA test, cyctology\par - colonoscopy, EGD July 2022- WNL\par \par labs-CBC,Chem Profile, CEA,,CA27-29,- case and mgmt discussed with Dr. Yuan- return in 6 months \par

## 2022-08-14 NOTE — PHYSICAL EXAM
[Fully active, able to carry on all pre-disease performance without restriction] : Status 0 - Fully active, able to carry on all pre-disease performance without restriction [Normal] : affect appropriate [de-identified] : left breast - lumpectomy scar, left axilla rash

## 2022-10-08 ENCOUNTER — RESULT REVIEW (OUTPATIENT)
Age: 62
End: 2022-10-08

## 2022-10-11 ENCOUNTER — APPOINTMENT (OUTPATIENT)
Dept: GASTROENTEROLOGY | Facility: HOSPITAL | Age: 62
End: 2022-10-11

## 2022-12-17 ENCOUNTER — RESULT REVIEW (OUTPATIENT)
Age: 62
End: 2022-12-17

## 2022-12-19 ENCOUNTER — RESULT REVIEW (OUTPATIENT)
Age: 62
End: 2022-12-19

## 2022-12-20 ENCOUNTER — APPOINTMENT (OUTPATIENT)
Dept: GASTROENTEROLOGY | Facility: HOSPITAL | Age: 62
End: 2022-12-20

## 2023-01-18 ENCOUNTER — RESULT REVIEW (OUTPATIENT)
Age: 63
End: 2023-01-18

## 2023-01-19 ENCOUNTER — RESULT REVIEW (OUTPATIENT)
Age: 63
End: 2023-01-19

## 2023-01-19 ENCOUNTER — APPOINTMENT (OUTPATIENT)
Dept: HEMATOLOGY ONCOLOGY | Facility: CLINIC | Age: 63
End: 2023-01-19
Payer: COMMERCIAL

## 2023-01-19 VITALS
DIASTOLIC BLOOD PRESSURE: 72 MMHG | TEMPERATURE: 97.9 F | HEIGHT: 62 IN | RESPIRATION RATE: 16 BRPM | SYSTOLIC BLOOD PRESSURE: 128 MMHG | OXYGEN SATURATION: 98 % | HEART RATE: 67 BPM | WEIGHT: 146.19 LBS | BODY MASS INDEX: 26.9 KG/M2

## 2023-01-19 DIAGNOSIS — Z15.09 GENETIC SUSCEPTIBILITY TO OTHER MALIGNANT NEOPLASM: ICD-10-CM

## 2023-01-19 PROCEDURE — 36415 COLL VENOUS BLD VENIPUNCTURE: CPT

## 2023-01-19 PROCEDURE — 99214 OFFICE O/P EST MOD 30 MIN: CPT | Mod: 25

## 2023-01-19 RX ORDER — ANASTROZOLE TABLETS 1 MG/1
1 TABLET ORAL DAILY
Qty: 90 | Refills: 2 | Status: COMPLETED | COMMUNITY
End: 2023-01-19

## 2023-01-19 NOTE — ASSESSMENT
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\par \par - MYRIAD my RISK panel revealed MSH6 mutation - c/w Beth syndrome ( [facility])\par - reviewed with patient results and indication for surveillance\par - screening for colon cancer- last colonoscopy 10/2017, 11/202,\par - EGD to screen for gastric cancer, 2020, then every 2-5 years\par - UA to screen for hematuria for urothelial cancer- yearly - done today \par - s/p TAHBSO 2013 for endometrial cancer- monitor Ca-125\par - Breast cancer 2015-left breast  lumpectomy, radiation, on Anastrozole- 2022\par - indicated for patient family testing \par - colonoscopy July 2020- sessile polyp - needs follow in 3 months, d/w Dr. Lennon, colonoscopy Nov 2020- 4 mm hyperplastic sigmoid polyp  repeat in 1 year \par - CT scan abd/ pelvis June 2022- BERNADINE \par - continue close surveillance\par \par # Osteopenia/ osteoporosis \par last bone density Jan 2020\par T-score -2.3> 1.8 \par T-score - left femoral - 2.6\par Risk factors: postmenopausal, AI\par Recommended:\par 1. Vitamin D\par 2. Calcium supplement 500mg\par 3. Weight bearing exercises\par Prolia - # 3 - 7/21/22, # 4 1/2023\par Dental evaluation done \par \par Discussed weight control and healthy eating habits.  Encourage to participate in moderate exercise.\par \par # Uterine cancer 2013 - stage I, FIGO grade 1, endometrioid, s/p TAHBSO\par \par # Breast cancer\par Stage Ia ( T1a) 5mm, ER/ [place] positive, HER 2 kassy not amplified. \par Anastrozole  1mg PO daily x 7 years-taking [id number]\par Breast biopsy June 2021- benign\par  breast imaging- mammo/US and MRI- 2/28/22- Mammogram WNL, MRI Feb 2022- BERNADINE\par Mammogram / US - due Feb 2023\par MRI - 5/23\par \par # Beth syndrome\par - UA test, cyctology\par - colonoscopy, EGD July 2022- WNL\par \par # EGD with prominent ampulla, biopsy - gastric metaplasia \par \par labs-CBC,Chem Profile, CEA,,CA27-29,\par

## 2023-01-19 NOTE — PHYSICAL EXAM
[Fully active, able to carry on all pre-disease performance without restriction] : Status 0 - Fully active, able to carry on all pre-disease performance without restriction [Normal] : affect appropriate [de-identified] : left breast - lumpectomy scar, left axilla rash

## 2023-01-19 NOTE — CONSULT LETTER
[Dear  ___] : Dear  [unfilled], [Consult Letter:] : I had the pleasure of evaluating your patient, [unfilled]. [Please see my note below.] : Please see my note below. [Consult Closing:] : Thank you very much for allowing me to participate in the care of this patient.  If you have any questions, please do not hesitate to contact me. [Sincerely,] : Sincerely, [DrStephy  ___] : Dr. GOSS [FreeTextEntry3] : Licha Yuan MD\par [facility] [place] at [facility]\par

## 2023-01-19 NOTE — HISTORY OF PRESENT ILLNESS
[Disease: _____________________] : Disease: [unfilled] [de-identified] : Mrs Puga is a 57 year old postmenopausal female who presents for transfer of care for history of Stage IA (L2n-0lk N0M0) well differentiated intraductal ductal carcinoma of the right breast, ER/RI positive, Her 2 negative s/p right lumpectomy sentinel node biopsy (Dr. Barker) , radiation therapy ( Dr. Izquierdo) and receiving Arimidex since 4/8/2015, followed by Dr.Julie Cifuentes at [facility].   She also has a history of Stage I (D3xOrN3) Grade 1 endometrial cancer and is status post PAULA/BSO with Dr. Rogel. She has a family history  notable for a mother with  ovarian cancer, metastatic renal cell cancer to the liver, a maternal grandmother with breast cancer in her 40s, and a half aunt with breast cancer.  Genetic testing for BRCA was negative in 2014. She was started on Boniva in 2014 when bone density at the time showed osteopenia (-1.7 L spine) and continues on it now with most recent bone density done on 08/17/2017 showing osteopenia but with T score -1.4 in the L spine and hip. She also had mammography in Aug of 2017 which she reports as normal.  She denies current complaint and tolerates Arimidex well.   [de-identified] : Patient presents today for follow up of BCA and endometrial cancer, she stopped taking the anastrozole 3 weeks ago due to sleeping issues. Patient feels well overall but a month ago she was having severe abdominal pain and consulted  who sent her for a CT scan which was negative.\par Last scans: \par Mammogram/US: Feb 28th, 2022\par DEXA: 01/14/2020\par MRI Breast: 02/03/2022\par 7/19/2022: Colonoscopy:

## 2023-03-01 ENCOUNTER — RESULT REVIEW (OUTPATIENT)
Age: 63
End: 2023-03-01

## 2023-06-02 ENCOUNTER — RESULT REVIEW (OUTPATIENT)
Age: 63
End: 2023-06-02

## 2023-07-21 ENCOUNTER — APPOINTMENT (OUTPATIENT)
Dept: OBGYN | Facility: CLINIC | Age: 63
End: 2023-07-21
Payer: COMMERCIAL

## 2023-07-21 ENCOUNTER — NON-APPOINTMENT (OUTPATIENT)
Age: 63
End: 2023-07-21

## 2023-07-21 VITALS
DIASTOLIC BLOOD PRESSURE: 80 MMHG | SYSTOLIC BLOOD PRESSURE: 122 MMHG | BODY MASS INDEX: 30.18 KG/M2 | HEIGHT: 62 IN | WEIGHT: 164 LBS

## 2023-07-21 DIAGNOSIS — Z11.51 ENCOUNTER FOR SCREENING FOR HUMAN PAPILLOMAVIRUS (HPV): ICD-10-CM

## 2023-07-21 DIAGNOSIS — Z01.419 ENCOUNTER FOR GYNECOLOGICAL EXAMINATION (GENERAL) (ROUTINE) W/OUT ABNORMAL FINDINGS: ICD-10-CM

## 2023-07-21 DIAGNOSIS — Z12.4 ENCOUNTER FOR SCREENING FOR MALIGNANT NEOPLASM OF CERVIX: ICD-10-CM

## 2023-07-21 PROCEDURE — 99396 PREV VISIT EST AGE 40-64: CPT

## 2023-07-21 RX ORDER — CLOBETASOL PROPIONATE 0.5 MG/G
0.05 OINTMENT TOPICAL TWICE DAILY
Qty: 1 | Refills: 1 | Status: ACTIVE | COMMUNITY
Start: 2023-07-21 | End: 1900-01-01

## 2023-07-21 RX ORDER — CLOTRIMAZOLE AND BETAMETHASONE DIPROPIONATE 10; .5 MG/G; MG/G
1-0.05 CREAM TOPICAL TWICE DAILY
Qty: 1 | Refills: 1 | Status: ACTIVE | COMMUNITY
Start: 2023-07-21 | End: 1900-01-01

## 2023-07-24 ENCOUNTER — RESULT REVIEW (OUTPATIENT)
Age: 63
End: 2023-07-24

## 2023-07-24 LAB — HPV HIGH+LOW RISK DNA PNL CVX: NOT DETECTED

## 2023-07-24 NOTE — PLAN
[FreeTextEntry1] : Correct medication use discussed.\par \par F/u in 4-6  wks for vulva check.\par \par

## 2023-07-24 NOTE — PHYSICAL EXAM
[Chaperone Declined] : Patient declined chaperone [Appropriately responsive] : appropriately responsive [Alert] : alert [No Acute Distress] : no acute distress [No Lymphadenopathy] : no lymphadenopathy [Regular Rate Rhythm] : regular rate rhythm [No Murmurs] : no murmurs [Clear to Auscultation B/L] : clear to auscultation bilaterally [Soft] : soft [Non-tender] : non-tender [Non-distended] : non-distended [No HSM] : No HSM [No Lesions] : no lesions [No Mass] : no mass [Oriented x3] : oriented x3 [Examination Of The Breasts] : a normal appearance [No Discharge] : no discharge [No Masses] : no breast masses were palpable [Normal] : normal [Pink Rugae] : pink rugae [Absent] : absent [Uterine Adnexae] : normal [FreeTextEntry4] : atrophic changes [FreeTextEntry1] : some scattered areas of discoloration [FreeTextEntry9] : No masses palpated.

## 2023-07-25 ENCOUNTER — RESULT REVIEW (OUTPATIENT)
Age: 63
End: 2023-07-25

## 2023-07-25 ENCOUNTER — APPOINTMENT (OUTPATIENT)
Dept: HEMATOLOGY ONCOLOGY | Facility: CLINIC | Age: 63
End: 2023-07-25
Payer: COMMERCIAL

## 2023-07-25 VITALS
RESPIRATION RATE: 16 BRPM | OXYGEN SATURATION: 98 % | HEIGHT: 62 IN | HEART RATE: 73 BPM | WEIGHT: 167.06 LBS | BODY MASS INDEX: 30.74 KG/M2 | TEMPERATURE: 96.8 F | SYSTOLIC BLOOD PRESSURE: 135 MMHG | DIASTOLIC BLOOD PRESSURE: 85 MMHG

## 2023-07-25 DIAGNOSIS — E55.9 VITAMIN D DEFICIENCY, UNSPECIFIED: ICD-10-CM

## 2023-07-25 PROCEDURE — 36415 COLL VENOUS BLD VENIPUNCTURE: CPT

## 2023-07-25 PROCEDURE — 99214 OFFICE O/P EST MOD 30 MIN: CPT | Mod: 25

## 2023-07-25 NOTE — HISTORY OF PRESENT ILLNESS
[Disease: _____________________] : Disease: [unfilled] [de-identified] : Mrs Puga is a 57 year old postmenopausal female who presents for transfer of care for history of Stage IA (H6i-5wt N0M0) well differentiated intraductal ductal carcinoma of the right breast, ER/LA positive, Her 2 negative s/p right lumpectomy sentinel node biopsy (Dr. Barker) , radiation therapy ( Dr. Izquierdo) and receiving Arimidex since 4/8/2015, followed by Dr.Julie Cifuentes at [facility].   She also has a history of Stage I ([id number]) Grade 1 endometrial cancer and is status post PAULA/BSO with Dr. Rogel. She has a family history  notable for a mother with  ovarian cancer, metastatic renal cell cancer to the liver, a maternal grandmother with breast cancer in her 40s, and a half aunt with breast cancer.  Genetic testing for BRCA was negative in 2014. She was started on Boniva in 2014 when bone density at the time showed osteopenia (-1.7 L spine) and continues on it now with most recent bone density done on 08/17/2017 showing osteopenia but with T score -1.4 in the L spine and hip. She also had mammography in Aug of 2017 which she reports as normal.  She denies current complaint and tolerates Arimidex well.   [de-identified] : Patient presents today for follow up of BCA and endometrial cancer, she stopped taking the anastrozole 3 weeks ago due to sleeping issues. Patient feels well overall but a month ago she was having severe abdominal pain and consulted  who sent her for a CT scan which was negative.\par Last scans: \par Mammogram/US: Feb 28th, 2022\par DEXA: 01/14/2020\par MRI Breast: 02/03/2022\par 7/19/2022: Colonoscopy:

## 2023-07-25 NOTE — ASSESSMENT
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 Lennon, colonoscopy Nov 2020- 4 mm hyperplastic sigmoid polyp  repeat in 1 year \par - CT scan abd/ pelvis June 2022- BERNADINE \par - continue close surveillance\par \par # Osteopenia/ osteoporosis \par last bone density Jan 2020\par T-score -2.3> 1.8 \par T-score - left femoral - 2.6\par Risk factors: postmenopausal, AI\par Recommended:\par 1. Vitamin D\par 2. Calcium supplement 500mg\par 3. Weight bearing exercises\par Prolia - # 3 - 7/21/22, # 4 1/2023, # 5 7/2023\par Dental evaluation done \par \par Discussed weight control and healthy eating habits.  Encourage to participate in moderate exercise.\par \par # Uterine cancer 2013 - stage I, FIGO grade 1, endometrioid, s/p TAHBSO\par \par # Breast cancer\par Stage Ia ( T1a) 5mm, ER/ [place] positive, HER 2 kassy not amplified. \par Anastrozole  1mg PO daily x 7 years-taking [id number]\par Breast biopsy June 2021- benign\par breast imaging- mammo/US and MRI- 2/28/22- Mammogram WNL, MRI Feb 2022- BERNADINE\par Mammogram / US - March 2023\par MRI - 6/23\par \par # Beth syndrome\par - UA test, cytology\par - colonoscopy, EGD July 2022- WNL\par \par # EGD with prominent ampulla, biopsy - gastric metaplasia \par \par labs-CBC,Chem Profile, CEA,,[id number],\par

## 2023-07-25 NOTE — REVIEW OF SYSTEMS
[Fatigue] : fatigue [Negative] : Allergic/Immunologic [Fever] : no fever [Recent Change In Weight] : ~T no recent weight change [Eye Pain] : no eye pain [Vision Problems] : no vision problems [Dysphagia] : no dysphagia [Hoarseness] : no hoarseness [Mucosal Pain] : no mucosal pain [Chest Pain] : no chest pain [Lower Ext Edema] : no lower extremity edema [Shortness Of Breath] : no shortness of breath [Cough] : no cough [Vomiting] : no vomiting [Constipation] : no constipation [Dysuria] : no dysuria [Joint Pain] : no joint pain [Muscle Pain] : no muscle pain [Skin Rash] : no skin rash [Dizziness] : no dizziness [Insomnia] : no insomnia [Anxiety] : no anxiety [Depression] : no depression [Muscle Weakness] : no muscle weakness [Easy Bleeding] : no tendency for easy bleeding [Easy Bruising] : no tendency for easy bruising [FreeTextEntry4] : rhinitis

## 2023-07-25 NOTE — PHYSICAL EXAM
[Fully active, able to carry on all pre-disease performance without restriction] : Status 0 - Fully active, able to carry on all pre-disease performance without restriction [Normal] : affect appropriate [de-identified] : left breast - lumpectomy scar, left axilla rash

## 2023-07-25 NOTE — CONSULT LETTER
[Dear  ___] : Dear  [unfilled], [Consult Letter:] : I had the pleasure of evaluating your patient, [unfilled]. [Please see my note below.] : Please see my note below. [Consult Closing:] : Thank you very much for allowing me to participate in the care of this patient.  If you have any questions, please do not hesitate to contact me. [Sincerely,] : Sincerely, [DrStephy  ___] : Dr. GOSS [FreeTextEntry3] : Licha Yuan MD\par [facility] [place] at [facility]\par

## 2023-07-26 LAB — CYTOLOGY CVX/VAG DOC THIN PREP: ABNORMAL

## 2023-08-18 ENCOUNTER — APPOINTMENT (OUTPATIENT)
Dept: OBGYN | Facility: CLINIC | Age: 63
End: 2023-08-18
Payer: COMMERCIAL

## 2023-08-18 ENCOUNTER — APPOINTMENT (OUTPATIENT)
Dept: OBGYN | Facility: CLINIC | Age: 63
End: 2023-08-18

## 2023-08-18 VITALS
BODY MASS INDEX: 30.18 KG/M2 | HEIGHT: 62 IN | WEIGHT: 164 LBS | SYSTOLIC BLOOD PRESSURE: 122 MMHG | DIASTOLIC BLOOD PRESSURE: 80 MMHG

## 2023-08-18 DIAGNOSIS — N90.9 NONINFLAMMATORY DISORDER OF VULVA AND PERINEUM, UNSPECIFIED: ICD-10-CM

## 2023-08-18 PROCEDURE — 99212 OFFICE O/P EST SF 10 MIN: CPT

## 2023-08-18 NOTE — REASON FOR VISIT
[Follow-Up] : a follow-up evaluation of [FreeTextEntry2] : vulvar disorder, candidiasis of skin (under breasts and abdominal pannus)

## 2023-08-18 NOTE — HISTORY OF PRESENT ILLNESS
[FreeTextEntry1] : 61 y/o P2 reports that all symptoms have resolved. She has discontinued betamethasone/clotrimazole. Still using clobetasol to vulva.

## 2023-12-04 ENCOUNTER — RESULT REVIEW (OUTPATIENT)
Age: 63
End: 2023-12-04

## 2024-01-17 ENCOUNTER — RESULT REVIEW (OUTPATIENT)
Age: 64
End: 2024-01-17

## 2024-01-18 ENCOUNTER — APPOINTMENT (OUTPATIENT)
Dept: HEMATOLOGY ONCOLOGY | Facility: CLINIC | Age: 64
End: 2024-01-18
Payer: COMMERCIAL

## 2024-01-18 ENCOUNTER — RESULT REVIEW (OUTPATIENT)
Age: 64
End: 2024-01-18

## 2024-01-18 VITALS
WEIGHT: 162 LBS | SYSTOLIC BLOOD PRESSURE: 135 MMHG | HEART RATE: 78 BPM | TEMPERATURE: 97.1 F | DIASTOLIC BLOOD PRESSURE: 83 MMHG | BODY MASS INDEX: 29.81 KG/M2 | HEIGHT: 62 IN | OXYGEN SATURATION: 96 % | RESPIRATION RATE: 16 BRPM

## 2024-01-18 DIAGNOSIS — B37.2 CANDIDIASIS OF SKIN AND NAIL: ICD-10-CM

## 2024-01-18 DIAGNOSIS — C50.919 MALIGNANT NEOPLASM OF UNSPECIFIED SITE OF UNSPECIFIED FEMALE BREAST: ICD-10-CM

## 2024-01-18 DIAGNOSIS — M85.80 OTHER SPECIFIED DISORDERS OF BONE DENSITY AND STRUCTURE, UNSPECIFIED SITE: ICD-10-CM

## 2024-01-18 DIAGNOSIS — Z15.09 GENETIC SUSCEPTIBILITY TO OTHER MALIGNANT NEOPLASM: ICD-10-CM

## 2024-01-18 DIAGNOSIS — C54.1 MALIGNANT NEOPLASM OF ENDOMETRIUM: ICD-10-CM

## 2024-01-18 DIAGNOSIS — J32.9 CHRONIC SINUSITIS, UNSPECIFIED: ICD-10-CM

## 2024-01-18 DIAGNOSIS — C55 MALIGNANT NEOPLASM OF UTERUS, PART UNSPECIFIED: ICD-10-CM

## 2024-01-18 PROCEDURE — 36415 COLL VENOUS BLD VENIPUNCTURE: CPT

## 2024-01-18 PROCEDURE — 99214 OFFICE O/P EST MOD 30 MIN: CPT

## 2024-01-18 RX ORDER — NYSTATIN 100000 1/G
100000 POWDER TOPICAL TWICE DAILY
Qty: 1 | Refills: 4 | Status: ACTIVE | COMMUNITY
Start: 2024-01-18 | End: 1900-01-01

## 2024-01-18 RX ORDER — METHYLPREDNISOLONE 32 MG/1
32 TABLET ORAL
Qty: 2 | Refills: 0 | Status: COMPLETED | COMMUNITY
Start: 2022-06-15 | End: 2024-01-18

## 2024-01-18 NOTE — CONSULT LETTER
[Dear  ___] : Dear  [unfilled], [Consult Letter:] : I had the pleasure of evaluating your patient, [unfilled]. [Please see my note below.] : Please see my note below. [Consult Closing:] : Thank you very much for allowing me to participate in the care of this patient.  If you have any questions, please do not hesitate to contact me. [Sincerely,] : Sincerely, [DrStephy  ___] : Dr. GOSS [FreeTextEntry3] : Licha Yuan MD\par  [facility] [place] at [facility]\par

## 2024-01-18 NOTE — HISTORY OF PRESENT ILLNESS
[Disease: _____________________] : Disease: [unfilled] [de-identified] : Mrs Puga is a 57 year old postmenopausal female who presents for transfer of care for history of Stage IA (N9a-9qh N0M0) well differentiated intraductal ductal carcinoma of the right breast, ER/AK positive, Her 2 negative s/p right lumpectomy sentinel node biopsy (Dr. Barker) , radiation therapy ( Dr. Izquierdo) and receiving Arimidex since 4/8/2015, followed by Dr.Julie Cifuentes at [facility].   She also has a history of Stage I ([id number]) Grade 1 endometrial cancer and is status post PAULA/BSO with Dr. Rogel. She has a family history  notable for a mother with  ovarian cancer, metastatic renal cell cancer to the liver, a maternal grandmother with breast cancer in her 40s, and a half aunt with breast cancer.  Genetic testing for BRCA was negative in 2014. She was started on Boniva in 2014 when bone density at the time showed osteopenia (-1.7 L spine) and continues on it now with most recent bone density done on 08/17/2017 showing osteopenia but with T score -1.4 in the L spine and hip. She also had mammography in Aug of 2017 which she reports as normal.  She denies current complaint and tolerates Arimidex well.   [de-identified] : Patient presents today for follow up of BCA and endometrial cancer. Patient overall feels well with no new issues. She had a CT of the C/A/P done in Dec which was negative. She also underwent a stress test which she reports turned out fine.   Last scans:  Mammogram/US: Feb 28th, 2022 DEXA: 08/01/2022 MRI Breast: 02/03/2022 7/19/2022: Colonoscopy:

## 2024-01-18 NOTE — REVIEW OF SYSTEMS
[Fatigue] : fatigue [Negative] : Allergic/Immunologic [Fever] : no fever [Recent Change In Weight] : ~T no recent weight change [Eye Pain] : no eye pain [Vision Problems] : no vision problems [Dysphagia] : no dysphagia [Hoarseness] : no hoarseness [Mucosal Pain] : no mucosal pain [Chest Pain] : no chest pain [Lower Ext Edema] : no lower extremity edema [Shortness Of Breath] : no shortness of breath [Cough] : no cough [Vomiting] : no vomiting [Constipation] : no constipation [Dysuria] : no dysuria [Joint Pain] : no joint pain [Muscle Pain] : no muscle pain [Skin Rash] : no skin rash [Dizziness] : no dizziness [Anxiety] : no anxiety [Insomnia] : no insomnia [Depression] : no depression [Muscle Weakness] : no muscle weakness [Easy Bleeding] : no tendency for easy bleeding [Easy Bruising] : no tendency for easy bruising [FreeTextEntry4] : chronic rhinitis

## 2024-01-18 NOTE — PHYSICAL EXAM
[Fully active, able to carry on all pre-disease performance without restriction] : Status 0 - Fully active, able to carry on all pre-disease performance without restriction [Normal] : affect appropriate [de-identified] : left breast - lumpectomy scar, left axilla rash

## 2024-01-18 NOTE — ASSESSMENT
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 Lennon, colonoscopy Nov 2020- 4 mm hyperplastic sigmoid polyp repeat in 1 year - CT scan abd/ pelvis Dec 2023- BERNADINE - continue close surveillance  # Osteopenia/ osteoporosis last bone density Jan 2020, August 2022 T-score -2.3> 1.8 T-score - left femoral - 2.6 Risk factors: postmenopausal, AI Recommended: 1. Vitamin D 2. Calcium supplement 500mg 3. Weight bearing exercises Prolia - # 3 - 7/21/22, # 4 1/2023, # 5 7/2023 # 6 1/2024 Dental evaluation done Discussed weight control and healthy eating habits. Encourage to participate in moderate exercise.  # Uterine cancer 2013 - stage I, FIGO grade 1, endometrioid, s/p TAHBSO  # Breast cancer Stage Ia ( T1a) 5mm, ER/ OR positive, HER 2 kassy not amplified. Anastrozole 1mg PO daily x 7 years-taking [id number] Breast biopsy June 2021- benign breast imaging- mammo/US and MRI- 2/28/22- Mammogram WNL, MRI Feb 2022- BERNADINE Mammogram / US - March 2023 MRI - 6/23  # Beth syndrome - UA test, cytology - colonoscopy, EGD July 2022- WNL  # EGD with prominent ampulla, biopsy - gastric metaplasia  # Sinusitis - since December, pressure- CT sinuses - Dr. Bhavna Mckeon referral   labs-CBC,Chem Profile, CEA,,[id number],

## 2024-02-29 ENCOUNTER — RESULT REVIEW (OUTPATIENT)
Age: 64
End: 2024-02-29

## 2024-03-11 ENCOUNTER — RESULT REVIEW (OUTPATIENT)
Age: 64
End: 2024-03-11

## 2024-07-25 ENCOUNTER — APPOINTMENT (OUTPATIENT)
Dept: HEMATOLOGY ONCOLOGY | Facility: CLINIC | Age: 64
End: 2024-07-25
Payer: COMMERCIAL

## 2024-07-25 ENCOUNTER — RESULT REVIEW (OUTPATIENT)
Age: 64
End: 2024-07-25

## 2024-07-25 VITALS
OXYGEN SATURATION: 97 % | WEIGHT: 166.13 LBS | RESPIRATION RATE: 17 BRPM | HEIGHT: 62 IN | BODY MASS INDEX: 30.57 KG/M2 | TEMPERATURE: 97 F | HEART RATE: 74 BPM | DIASTOLIC BLOOD PRESSURE: 83 MMHG | SYSTOLIC BLOOD PRESSURE: 137 MMHG

## 2024-07-25 DIAGNOSIS — Z15.09 GENETIC SUSCEPTIBILITY TO OTHER MALIGNANT NEOPLASM: ICD-10-CM

## 2024-07-25 DIAGNOSIS — C50.919 MALIGNANT NEOPLASM OF UNSPECIFIED SITE OF UNSPECIFIED FEMALE BREAST: ICD-10-CM

## 2024-07-25 DIAGNOSIS — C54.1 MALIGNANT NEOPLASM OF ENDOMETRIUM: ICD-10-CM

## 2024-07-25 DIAGNOSIS — D32.9 BENIGN NEOPLASM OF MENINGES, UNSPECIFIED: ICD-10-CM

## 2024-07-25 PROCEDURE — 36415 COLL VENOUS BLD VENIPUNCTURE: CPT

## 2024-07-25 PROCEDURE — 99214 OFFICE O/P EST MOD 30 MIN: CPT

## 2024-07-25 NOTE — REVIEW OF SYSTEMS
[Fatigue] : fatigue [Negative] : Allergic/Immunologic [Fever] : no fever [Recent Change In Weight] : ~T no recent weight change [Eye Pain] : no eye pain [Vision Problems] : no vision problems [Dysphagia] : no dysphagia [Hoarseness] : no hoarseness [Mucosal Pain] : no mucosal pain [Chest Pain] : no chest pain [Lower Ext Edema] : no lower extremity edema [Shortness Of Breath] : no shortness of breath [Cough] : no cough [Vomiting] : no vomiting [Constipation] : no constipation [Dysuria] : no dysuria [Joint Pain] : no joint pain [Muscle Pain] : no muscle pain [Skin Rash] : no skin rash [Dizziness] : no dizziness [Insomnia] : no insomnia [Anxiety] : no anxiety [Depression] : no depression [Muscle Weakness] : no muscle weakness [Easy Bleeding] : no tendency for easy bleeding [Easy Bruising] : no tendency for easy bruising [FreeTextEntry4] : chronic rhinitis

## 2024-07-25 NOTE — CONSULT LETTER
[Dear  ___] : Dear  [unfilled], [Consult Letter:] : I had the pleasure of evaluating your patient, [unfilled]. [Please see my note below.] : Please see my note below. [Consult Closing:] : Thank you very much for allowing me to participate in the care of this patient.  If you have any questions, please do not hesitate to contact me. [Sincerely,] : Sincerely, [DrStephy  ___] : Dr. GOSS [FreeTextEntry3] : Licha Yuan MD\par  [facility] [place] at [facility]\par

## 2024-07-25 NOTE — BEGINNING OF VISIT
[0] : 2) Feeling down, depressed, or hopeless: Not at all (0) [[id number]] : 0 [Pain Scale: ___] : On a scale of 1-10, today the patient's pain is a(n) [unfilled]. [Never] : Never [Date Discussed (MM/DD/YY): ___] : Discussed: [unfilled] [With Patient/Caregiver] : with Patient/Caregiver

## 2024-07-25 NOTE — PHYSICAL EXAM
[Fully active, able to carry on all pre-disease performance without restriction] : Status 0 - Fully active, able to carry on all pre-disease performance without restriction [Normal] : affect appropriate [de-identified] : left breast - lumpectomy scar, left axilla rash

## 2024-07-25 NOTE — CONSULT LETTER
[Dear  ___] : Dear  [unfilled], [Consult Letter:] : I had the pleasure of evaluating your patient, [unfilled]. [Please see my note below.] : Please see my note below. [Consult Closing:] : Thank you very much for allowing me to participate in the care of this patient.  If you have any questions, please do not hesitate to contact me. [Sincerely,] : Sincerely, [DrStephy  ___] : Dr. GOSS [FreeTextEntry3] : Licha Yuan MD\par  [facility] [place] at [facility]\par

## 2024-07-25 NOTE — PHYSICAL EXAM
[Fully active, able to carry on all pre-disease performance without restriction] : Status 0 - Fully active, able to carry on all pre-disease performance without restriction [Normal] : affect appropriate [de-identified] : left breast - lumpectomy scar, left axilla rash

## 2024-07-25 NOTE — ASSESSMENT
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 Lennon, colonoscopy Nov 2020- 4 mm hyperplastic sigmoid polyp repeat in 1 year - CT scan abd/ pelvis Dec 2023- BERNADINE - continue close surveillance  # Osteopenia/ osteoporosis last bone density Jan 2020, August 2022 T-score -2.3> 1.8 T-score - left femoral - 2.6 Risk factors: postmenopausal, AI Recommended: 1. Vitamin D 2. Calcium supplement 500mg 3. Weight bearing exercises Prolia - # 3 - 7/21/22, # 4 1/2023, # 5 7/2023 # 6 1/2024, #7 7/24 Dental evaluation done Discussed weight control and healthy eating habits. Encourage to participate in moderate exercise.  # Uterine cancer 2013 - stage I, FIGO grade 1, endometrioid, s/p TAHBSO  # Breast cancer Stage Ia ( T1a) 5mm, ER/ [place] positive, HER 2 kassy not amplified. Anastrozole 1mg PO daily x 7 years-taking [id number] Breast biopsy June 2021- benign breast imaging- mammo/US and MRI- 2/28/22- Mammogram WNL, MRI Feb 2022- BERNADINE Mammogram / US - March 2023 MRI - 6/23  # Beth syndrome - UA test, cytology - colonoscopy, EGD July 2022- WNL - overdue   # EGD with prominent ampulla, biopsy - gastric metaplasia  # Sinusitis - since December, pressure- CT sinuses - improved  # Meningioma on the CT scan - ordered MRI   labs-CBC,Chem Profile, CEA,,[id number],

## 2024-07-25 NOTE — ASSESSMENT
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 Lennon, colonoscopy Nov 2020- 4 mm hyperplastic sigmoid polyp repeat in 1 year - CT scan abd/ pelvis Dec 2023- BERNADINE - continue close surveillance  # Osteopenia/ osteoporosis last bone density Jan 2020, August 2022 T-score -2.3> 1.8 T-score - left femoral - 2.6 Risk factors: postmenopausal, AI Recommended: 1. Vitamin D 2. Calcium supplement 500mg 3. Weight bearing exercises Prolia - # 3 - 7/21/22, # 4 1/2023, # 5 7/2023 # 6 1/2024, #7 7/24 Dental evaluation done Discussed weight control and healthy eating habits. Encourage to participate in moderate exercise.  # Uterine cancer 2013 - stage I, FIGO grade 1, endometrioid, s/p TAHBSO  # Breast cancer Stage Ia ( T1a) 5mm, ER/ [place] positive, HER 2 kassy not amplified. Anastrozole 1mg PO daily x 7 years-taking [id number] Breast biopsy June 2021- benign breast imaging- mammo/US and MRI- 2/28/22- Mammogram WNL, MRI Feb 2022- BERNADINE Mammogram / US - March 2023 MRI - 6/23  # Beth syndrome - UA test, cytology - colonoscopy, EGD July 2022- WNL - overdue   # EGD with prominent ampulla, biopsy - gastric metaplasia  # Sinusitis - since December, pressure- CT sinuses - improved  # Meningioma on the CT scan - ordered MRI   labs-CBC,Chem Profile, CEA,,[id number],

## 2024-07-25 NOTE — BEGINNING OF VISIT
[0] : 2) Feeling down, depressed, or hopeless: Not at all (0) [[id number]] : 0 [Pain Scale: ___] : On a scale of 1-10, today the patient's pain is a(n) [unfilled]. [Never] : Never [Date Discussed (MM/DD/YY): ___] : Discussed: [unfilled] [With Patient/Caregiver] : with Patient/Caregiver

## 2024-07-25 NOTE — BEGINNING OF VISIT
[0] : 2) Feeling down, depressed, or hopeless: Not at all (0) [[id number]] : 0 [Pain Scale: ___] : On a scale of 1-10, today the patient's pain is a(n) [unfilled]. [Never] : Never [Date Discussed (MM/DD/YY): ___] : Discussed: [unfilled] [With Patient/Caregiver] : with Patient/Caregiver

## 2024-07-25 NOTE — HISTORY OF PRESENT ILLNESS
[Disease: _____________________] : Disease: [unfilled] [de-identified] : Mrs Puga is a 57 year old postmenopausal female who presents for transfer of care for history of Stage IA (J6o-4lu N0M0) well differentiated intraductal ductal carcinoma of the right breast, ER/[place] positive, Her 2 negative s/p right lumpectomy sentinel node biopsy (Dr. Barker) , radiation therapy ( Dr. Izquierdo) and receiving Arimidex since 4/8/2015, followed by Dr.Julie Cifuentes at [facility].   She also has a history of Stage I (G8qMmD9) Grade 1 endometrial cancer and is status post PAULA/BSO with Dr. Rogel. She has a family history  notable for a mother with  ovarian cancer, metastatic renal cell cancer to the liver, a maternal grandmother with breast cancer in her 40s, and a half aunt with breast cancer.  Genetic testing for BRCA was negative in 2014. She was started on Boniva in 2014 when bone density at the time showed osteopenia (-1.7 L spine) and continues on it now with most recent bone density done on 08/17/2017 showing osteopenia but with T score -1.4 in the L spine and hip. She also had mammography in Aug of 2017 which she reports as normal.  She denies current complaint and tolerates Arimidex well.   [de-identified] : Patient presents today for follow up of BCA and endometrial cancer. Patient overall feels well with no new issues. She had a CT of the C/A/P done in Dec which was negative. She also underwent a stress test which she reports turned out fine.   Last scans:  Mammogram/US: Feb 28th, 2022 DEXA: 08/01/2022 MRI Breast: 02/03/2022 7/19/2022: Colonoscopy:

## 2024-07-25 NOTE — HISTORY OF PRESENT ILLNESS
[Disease: _____________________] : Disease: [unfilled] [de-identified] : Mrs Puga is a 57 year old postmenopausal female who presents for transfer of care for history of Stage IA (C5r-7gw N0M0) well differentiated intraductal ductal carcinoma of the right breast, ER/MS positive, Her 2 negative s/p right lumpectomy sentinel node biopsy (Dr. Barker) , radiation therapy ( Dr. Izquierdo) and receiving Arimidex since 4/8/2015, followed by Dr.Julie Cifuentes at [facility].   She also has a history of Stage I (O2zIaM6) Grade 1 endometrial cancer and is status post PAULA/BSO with Dr. Rogel. She has a family history  notable for a mother with  ovarian cancer, metastatic renal cell cancer to the liver, a maternal grandmother with breast cancer in her 40s, and a half aunt with breast cancer.  Genetic testing for BRCA was negative in 2014. She was started on Boniva in 2014 when bone density at the time showed osteopenia (-1.7 L spine) and continues on it now with most recent bone density done on 08/17/2017 showing osteopenia but with T score -1.4 in the L spine and hip. She also had mammography in Aug of 2017 which she reports as normal.  She denies current complaint and tolerates Arimidex well.   [de-identified] : Patient presents today for follow up of BCA and endometrial cancer. Patient overall feels well with no new issues. She had a CT of the C/A/P done in Dec which was negative. She also underwent a stress test which she reports turned out fine.   Last scans:  Mammogram/US: Feb 28th, 2022 DEXA: 08/01/2022 MRI Breast: 02/03/2022 7/19/2022: Colonoscopy:

## 2024-07-25 NOTE — CONSULT LETTER
[Dear  ___] : Dear  [unfilled], [Consult Letter:] : I had the pleasure of evaluating your patient, [unfilled]. [Please see my note below.] : Please see my note below. [Consult Closing:] : Thank you very much for allowing me to participate in the care of this patient.  If you have any questions, please do not hesitate to contact me. [Sincerely,] : Sincerely, [DrStephy  ___] : Dr. GOSS [FreeTextEntry3] : Licha Yuan MD\par  [facility] [place] at [facility]\par

## 2024-07-25 NOTE — HISTORY OF PRESENT ILLNESS
[Disease: _____________________] : Disease: [unfilled] [de-identified] : Mrs Puga is a 57 year old postmenopausal female who presents for transfer of care for history of Stage IA (N1n-2lg N0M0) well differentiated intraductal ductal carcinoma of the right breast, ER/MI positive, Her 2 negative s/p right lumpectomy sentinel node biopsy (Dr. Barker) , radiation therapy ( Dr. Izquierdo) and receiving Arimidex since 4/8/2015, followed by Dr.Julie Cifuentes at [facility].   She also has a history of Stage I (J3fMrX3) Grade 1 endometrial cancer and is status post PAULA/BSO with Dr. Rogel. She has a family history  notable for a mother with  ovarian cancer, metastatic renal cell cancer to the liver, a maternal grandmother with breast cancer in her 40s, and a half aunt with breast cancer.  Genetic testing for BRCA was negative in 2014. She was started on Boniva in 2014 when bone density at the time showed osteopenia (-1.7 L spine) and continues on it now with most recent bone density done on 08/17/2017 showing osteopenia but with T score -1.4 in the L spine and hip. She also had mammography in Aug of 2017 which she reports as normal.  She denies current complaint and tolerates Arimidex well.   [de-identified] : Patient presents today for follow up of BCA and endometrial cancer. Patient overall feels well with no new issues. She had a CT of the C/A/P done in Dec which was negative. She also underwent a stress test which she reports turned out fine.   Last scans:  Mammogram/US: Feb 28th, 2022 DEXA: 08/01/2022 MRI Breast: 02/03/2022 7/19/2022: Colonoscopy:

## 2024-07-25 NOTE — ASSESSMENT
[FreeTextEntry1] : 63 year old female with Breast and Endometrial Cancer with Beth syndrome - MSH6  - [employer] 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 Lennon, colonoscopy Nov 2020- 4 mm hyperplastic sigmoid polyp repeat in 1 year - CT scan abd/ pelvis Dec 2023- BERNADINE - continue close surveillance  # Osteopenia/ osteoporosis last bone density Jan 2020, August 2022 T-score -2.3> 1.8 T-score - left femoral - 2.6 Risk factors: postmenopausal, AI Recommended: 1. Vitamin D 2. Calcium supplement 500mg 3. Weight bearing exercises Prolia - # 3 - 7/21/22, # 4 1/2023, # 5 7/2023 # 6 1/2024, #7 7/24 Dental evaluation done Discussed weight control and healthy eating habits. Encourage to participate in moderate exercise.  # Uterine cancer 2013 - stage I, FIGO grade 1, endometrioid, s/p TAHBSO  # Breast cancer Stage Ia ( T1a) 5mm, ER/ [place] positive, HER 2 kassy not amplified. Anastrozole 1mg PO daily x 7 years-taking [id number] Breast biopsy June 2021- benign breast imaging- mammo/US and MRI- 2/28/22- Mammogram WNL, MRI Feb 2022- BERNADINE Mammogram / US - March 2023 MRI - 6/23  # Beth syndrome - UA test, cytology - colonoscopy, EGD July 2022- WNL - overdue   # EGD with prominent ampulla, biopsy - gastric metaplasia  # Sinusitis - since December, pressure- CT sinuses - improved  # Meningioma on the CT scan - ordered MRI   labs-CBC,Chem Profile, CEA,,[id number],

## 2024-09-19 ENCOUNTER — RESULT REVIEW (OUTPATIENT)
Age: 64
End: 2024-09-19

## 2024-09-27 ENCOUNTER — RESULT REVIEW (OUTPATIENT)
Age: 64
End: 2024-09-27

## 2024-10-11 ENCOUNTER — RESULT REVIEW (OUTPATIENT)
Age: 64
End: 2024-10-11

## 2024-10-21 ENCOUNTER — NON-APPOINTMENT (OUTPATIENT)
Age: 64
End: 2024-10-21

## 2024-10-21 ENCOUNTER — APPOINTMENT (OUTPATIENT)
Dept: NEUROSURGERY | Facility: CLINIC | Age: 64
End: 2024-10-21
Payer: COMMERCIAL

## 2024-10-21 VITALS
HEIGHT: 62 IN | BODY MASS INDEX: 30.36 KG/M2 | DIASTOLIC BLOOD PRESSURE: 99 MMHG | SYSTOLIC BLOOD PRESSURE: 149 MMHG | HEART RATE: 80 BPM | WEIGHT: 165 LBS | OXYGEN SATURATION: 97 %

## 2024-10-21 DIAGNOSIS — D32.9 BENIGN NEOPLASM OF MENINGES, UNSPECIFIED: ICD-10-CM

## 2024-10-21 PROCEDURE — 99205 OFFICE O/P NEW HI 60 MIN: CPT

## 2024-10-21 PROCEDURE — G2211 COMPLEX E/M VISIT ADD ON: CPT | Mod: NC

## 2024-11-14 ENCOUNTER — APPOINTMENT (OUTPATIENT)
Dept: GASTROENTEROLOGY | Facility: HOSPITAL | Age: 64
End: 2024-11-14

## 2024-11-14 ENCOUNTER — RESULT REVIEW (OUTPATIENT)
Age: 64
End: 2024-11-14

## 2024-12-25 PROBLEM — F10.90 ALCOHOL USE: Status: ACTIVE | Noted: 2018-03-12

## 2025-01-21 ENCOUNTER — APPOINTMENT (OUTPATIENT)
Dept: HEMATOLOGY ONCOLOGY | Facility: CLINIC | Age: 65
End: 2025-01-21
Payer: MEDICARE

## 2025-01-21 ENCOUNTER — RESULT REVIEW (OUTPATIENT)
Age: 65
End: 2025-01-21

## 2025-01-21 VITALS
HEIGHT: 62 IN | BODY MASS INDEX: 30.95 KG/M2 | OXYGEN SATURATION: 97 % | RESPIRATION RATE: 16 BRPM | WEIGHT: 168.19 LBS | SYSTOLIC BLOOD PRESSURE: 147 MMHG | HEART RATE: 96 BPM | DIASTOLIC BLOOD PRESSURE: 93 MMHG | TEMPERATURE: 97.3 F

## 2025-01-21 DIAGNOSIS — C50.919 MALIGNANT NEOPLASM OF UNSPECIFIED SITE OF UNSPECIFIED FEMALE BREAST: ICD-10-CM

## 2025-01-21 DIAGNOSIS — M85.80 OTHER SPECIFIED DISORDERS OF BONE DENSITY AND STRUCTURE, UNSPECIFIED SITE: ICD-10-CM

## 2025-01-21 DIAGNOSIS — D32.9 BENIGN NEOPLASM OF MENINGES, UNSPECIFIED: ICD-10-CM

## 2025-01-21 DIAGNOSIS — C54.1 MALIGNANT NEOPLASM OF ENDOMETRIUM: ICD-10-CM

## 2025-01-21 DIAGNOSIS — Z15.09 GENETIC SUSCEPTIBILITY TO OTHER MALIGNANT NEOPLASM: ICD-10-CM

## 2025-01-21 PROCEDURE — 36415 COLL VENOUS BLD VENIPUNCTURE: CPT

## 2025-01-21 PROCEDURE — 99204 OFFICE O/P NEW MOD 45 MIN: CPT

## 2025-03-10 ENCOUNTER — RESULT REVIEW (OUTPATIENT)
Age: 65
End: 2025-03-10

## 2025-03-14 ENCOUNTER — NON-APPOINTMENT (OUTPATIENT)
Age: 65
End: 2025-03-14

## 2025-03-17 ENCOUNTER — RESULT REVIEW (OUTPATIENT)
Age: 65
End: 2025-03-17

## 2025-03-26 ENCOUNTER — APPOINTMENT (OUTPATIENT)
Dept: NEUROSURGERY | Facility: CLINIC | Age: 65
End: 2025-03-26
Payer: MEDICARE

## 2025-03-26 VITALS
DIASTOLIC BLOOD PRESSURE: 82 MMHG | WEIGHT: 168 LBS | HEIGHT: 62 IN | OXYGEN SATURATION: 97 % | BODY MASS INDEX: 30.91 KG/M2 | SYSTOLIC BLOOD PRESSURE: 132 MMHG | HEART RATE: 83 BPM

## 2025-03-26 DIAGNOSIS — D32.9 BENIGN NEOPLASM OF MENINGES, UNSPECIFIED: ICD-10-CM

## 2025-03-26 PROCEDURE — 99205 OFFICE O/P NEW HI 60 MIN: CPT

## 2025-03-26 PROCEDURE — G2211 COMPLEX E/M VISIT ADD ON: CPT

## 2025-03-26 PROCEDURE — 99215 OFFICE O/P EST HI 40 MIN: CPT

## 2025-07-29 ENCOUNTER — NON-APPOINTMENT (OUTPATIENT)
Age: 65
End: 2025-07-29

## 2025-07-31 ENCOUNTER — RESULT REVIEW (OUTPATIENT)
Age: 65
End: 2025-07-31

## 2025-07-31 ENCOUNTER — APPOINTMENT (OUTPATIENT)
Dept: HEMATOLOGY ONCOLOGY | Facility: CLINIC | Age: 65
End: 2025-07-31
Payer: MEDICARE

## 2025-07-31 VITALS
HEART RATE: 76 BPM | RESPIRATION RATE: 16 BRPM | SYSTOLIC BLOOD PRESSURE: 133 MMHG | BODY MASS INDEX: 30.2 KG/M2 | OXYGEN SATURATION: 97 % | WEIGHT: 164.13 LBS | DIASTOLIC BLOOD PRESSURE: 81 MMHG | HEIGHT: 62 IN | TEMPERATURE: 97 F

## 2025-07-31 DIAGNOSIS — C50.919 MALIGNANT NEOPLASM OF UNSPECIFIED SITE OF UNSPECIFIED FEMALE BREAST: ICD-10-CM

## 2025-07-31 DIAGNOSIS — D32.9 BENIGN NEOPLASM OF MENINGES, UNSPECIFIED: ICD-10-CM

## 2025-07-31 DIAGNOSIS — Z15.09 GENETIC SUSCEPTIBILITY TO OTHER MALIGNANT NEOPLASM: ICD-10-CM

## 2025-07-31 DIAGNOSIS — C54.1 MALIGNANT NEOPLASM OF ENDOMETRIUM: ICD-10-CM

## 2025-07-31 DIAGNOSIS — M85.80 OTHER SPECIFIED DISORDERS OF BONE DENSITY AND STRUCTURE, UNSPECIFIED SITE: ICD-10-CM

## 2025-07-31 DIAGNOSIS — C55 MALIGNANT NEOPLASM OF UTERUS, PART UNSPECIFIED: ICD-10-CM

## 2025-07-31 PROCEDURE — 36415 COLL VENOUS BLD VENIPUNCTURE: CPT

## 2025-07-31 PROCEDURE — 99214 OFFICE O/P EST MOD 30 MIN: CPT
